# Patient Record
Sex: MALE | Race: WHITE | NOT HISPANIC OR LATINO | Employment: FULL TIME | URBAN - METROPOLITAN AREA
[De-identification: names, ages, dates, MRNs, and addresses within clinical notes are randomized per-mention and may not be internally consistent; named-entity substitution may affect disease eponyms.]

---

## 2017-05-05 ENCOUNTER — ALLSCRIPTS OFFICE VISIT (OUTPATIENT)
Dept: OTHER | Facility: OTHER | Age: 47
End: 2017-05-05

## 2017-05-05 DIAGNOSIS — E78.5 HYPERLIPIDEMIA: ICD-10-CM

## 2017-05-05 DIAGNOSIS — E03.9 HYPOTHYROIDISM: ICD-10-CM

## 2017-05-05 DIAGNOSIS — E55.9 VITAMIN D DEFICIENCY: ICD-10-CM

## 2017-07-06 ENCOUNTER — LAB CONVERSION - ENCOUNTER (OUTPATIENT)
Dept: OTHER | Facility: OTHER | Age: 47
End: 2017-07-06

## 2017-07-06 LAB
25(OH)D3 SERPL-MCNC: 40 NG/ML (ref 30–100)
A/G RATIO (HISTORICAL): 1.7 (CALC) (ref 1–2.5)
ALBUMIN SERPL BCP-MCNC: 4.3 G/DL (ref 3.6–5.1)
ALP SERPL-CCNC: 55 U/L (ref 40–115)
ALT SERPL W P-5'-P-CCNC: 18 U/L (ref 9–46)
AST SERPL W P-5'-P-CCNC: 15 U/L (ref 10–40)
BILIRUB SERPL-MCNC: 0.4 MG/DL (ref 0.2–1.2)
BUN SERPL-MCNC: 12 MG/DL (ref 7–25)
BUN/CREA RATIO (HISTORICAL): ABNORMAL (CALC) (ref 6–22)
CALCIUM SERPL-MCNC: 9.5 MG/DL (ref 8.6–10.3)
CHLORIDE SERPL-SCNC: 105 MMOL/L (ref 98–110)
CHOLEST SERPL-MCNC: 187 MG/DL (ref 125–200)
CHOLEST/HDLC SERPL: 4.2 (CALC)
CO2 SERPL-SCNC: 29 MMOL/L (ref 20–31)
CREAT SERPL-MCNC: 1.13 MG/DL (ref 0.6–1.35)
EGFR AFRICAN AMERICAN (HISTORICAL): 89 ML/MIN/1.73M2
EGFR-AMERICAN CALC (HISTORICAL): 77 ML/MIN/1.73M2
GAMMA GLOBULIN (HISTORICAL): 2.6 G/DL (CALC) (ref 1.9–3.7)
GLUCOSE (HISTORICAL): 104 MG/DL (ref 65–99)
HDLC SERPL-MCNC: 45 MG/DL
LDL CHOLESTEROL (HISTORICAL): 101 MG/DL (CALC)
NON-HDL-CHOL (CHOL-HDL) (HISTORICAL): 142 MG/DL (CALC)
POTASSIUM SERPL-SCNC: 4.4 MMOL/L (ref 3.5–5.3)
SODIUM SERPL-SCNC: 142 MMOL/L (ref 135–146)
T3 SERPL-MCNC: 86 NG/DL (ref 76–181)
T3FREE SERPL-MCNC: 2.7 PG/ML (ref 2.3–4.2)
T4 FREE SERPL-MCNC: 0.7 NG/DL (ref 0.8–1.8)
TOTAL PROTEIN (HISTORICAL): 6.9 G/DL (ref 6.1–8.1)
TRIGL SERPL-MCNC: 203 MG/DL
TSH SERPL DL<=0.05 MIU/L-ACNC: 25.6 MIU/L (ref 0.4–4.5)

## 2017-07-11 ENCOUNTER — ALLSCRIPTS OFFICE VISIT (OUTPATIENT)
Dept: OTHER | Facility: OTHER | Age: 47
End: 2017-07-11

## 2017-12-28 ENCOUNTER — APPOINTMENT (OUTPATIENT)
Dept: LAB | Age: 47
End: 2017-12-28
Payer: COMMERCIAL

## 2017-12-28 ENCOUNTER — TRANSCRIBE ORDERS (OUTPATIENT)
Dept: ADMINISTRATIVE | Age: 47
End: 2017-12-28

## 2017-12-28 DIAGNOSIS — E78.5 HYPERLIPIDEMIA: ICD-10-CM

## 2017-12-28 DIAGNOSIS — E03.9 HYPOTHYROIDISM: ICD-10-CM

## 2017-12-28 LAB
EST. AVERAGE GLUCOSE BLD GHB EST-MCNC: 117 MG/DL
HBA1C MFR BLD: 5.7 % (ref 4.2–6.3)
T3 SERPL-MCNC: 1.3 NG/ML (ref 0.6–1.8)
T3FREE SERPL-MCNC: 3.96 PG/ML (ref 2.3–4.2)
T4 FREE SERPL-MCNC: 0.72 NG/DL (ref 0.76–1.46)
TSH SERPL DL<=0.05 MIU/L-ACNC: 0.47 UIU/ML (ref 0.36–3.74)

## 2017-12-28 PROCEDURE — 84439 ASSAY OF FREE THYROXINE: CPT

## 2017-12-28 PROCEDURE — 84443 ASSAY THYROID STIM HORMONE: CPT

## 2017-12-28 PROCEDURE — 84480 ASSAY TRIIODOTHYRONINE (T3): CPT

## 2017-12-28 PROCEDURE — 84481 FREE ASSAY (FT-3): CPT

## 2017-12-28 PROCEDURE — 36415 COLL VENOUS BLD VENIPUNCTURE: CPT

## 2017-12-28 PROCEDURE — 83036 HEMOGLOBIN GLYCOSYLATED A1C: CPT

## 2018-01-01 DIAGNOSIS — E78.5 HYPERLIPIDEMIA: ICD-10-CM

## 2018-01-01 DIAGNOSIS — E03.9 HYPOTHYROIDISM: ICD-10-CM

## 2018-01-09 ENCOUNTER — ALLSCRIPTS OFFICE VISIT (OUTPATIENT)
Dept: OTHER | Facility: OTHER | Age: 48
End: 2018-01-09

## 2018-01-09 NOTE — RESULT NOTES
Verified Results  (1) T4, FREE [id number] 52:61HM [employer]     Test Name Result Flag Reference   T4, FREE 0 7 ng/dL L 0 8-1 8     (1) FREE T3 [id number] 05:05FA [employer]     Test Name Result Flag Reference   T3, FREE 3 4 pg/mL  2 3-4 2     (1) T3 TOTAL [id number] 05:00CN [employer]     Test Name Result Flag Reference   T3, TOTAL 133 ng/dL       (1) COMPREHENSIVE METABOLIC PANEL [id number] 18:73FJ [employer]     Test Name Result Flag Reference   GLUCOSE 90 mg/dL  65-99   Fasting reference interval   UREA NITROGEN (BUN) 13 mg/dL  7-25   CREATININE 1 07 mg/dL  0 60-1 35   eGFR NON-AFR   AMERICAN 83 mL/min/1 73m2  > OR = 60   eGFR AFRICAN AMERICAN 96 mL/min/1 73m2  > OR = 60   BUN/CREATININE RATIO   4-17   NOT APPLICABLE (calc)   SODIUM 139 mmol/L  135-146   POTASSIUM 3 9 mmol/L  3 5-5 3   CHLORIDE 103 mmol/L     CARBON DIOXIDE 27 mmol/L  19-30   CALCIUM 9 3 mg/dL  8 6-10 3   PROTEIN, TOTAL 7 0 g/dL  6 1-8 1   ALBUMIN 4 5 g/dL  3 6-5 1   GLOBULIN 2 5 g/dL (calc)  1 9-3 7   ALBUMIN/GLOBULIN RATIO 1 8 (calc)  1 0-2 5   BILIRUBIN, TOTAL 0 4 mg/dL  0 2-1 2   ALKALINE PHOSPHATASE 49 U/L     AST 14 U/L  10-40   ALT 14 U/L  9-46     (Q) TSH, 3RD GENERATION [id number] 27:64WY [employer]     Test Name Result Flag Reference   TSH 4 92 mIU/L H 0 40-4 50

## 2018-01-09 NOTE — PROGRESS NOTES
Assessment    1  Neck pain (723 1) (M54 2)   2  Cervical somatic dysfunction (739 1) (M99 01)    Plan  Neck pain    · Hydrocodone-Acetaminophen 5-300 MG Oral Tablet; TAKE 1 TABLET EVERY 4 TO  6 HOURS AS NEEDED FOR PAIN   · Naproxen 500 MG Oral Tablet; TAKE ONE TABLET BY MOUTH TWICE DAILY AS  NEEDED    Discussion/Summary    R I H ROM AS  cont w DC  RX naproxen and vicodin  d/c tramadol due to SE  IFB > 2+ more weeks , MRI  CB 2 wks  The patient, patient's family was counseled regarding prognosis, risks and benefits of treatment options  Chief Complaint  Pt is here c/o right shoulder pain  Pt thinks it's a pinched nerve  He see's a chiro for this and hasn't been helping  History of Present Illness  Neck Pain (Brief): The patient is being seen for an initial evaluation of neck pain  Symptoms:  neck pain, neck stiffness, neck muscle spasm, neck tenderness, shoulder pain and decreased neck range of motion  Associated symptoms:  upper extremity paresthesias, but no fever and no headache  HPI: 40 yo wm, HWW for eval of neck pain  Sees DC  ?? help  Tramadol RX prev make him ?? dizz  RUE tingling ?? improved  No specific injury  Review of Systems    Constitutional: no fever or chills, feels well, no tiredness, no recent weight loss or weight gain  Cardiovascular: no complaints of slow or fast heart rate, no chest pain, no palpitations, no leg claudication or lower extremity edema  Respiratory: no complaints of shortness of breath, no wheezing or cough, no dyspnea on exertion, no orthopnea or PND  Musculoskeletal: as noted in HPI  Active Problems    1  Abdominal pain (789 00) (R10 9)   2  Abdominal pain, suprapubic (789 09) (R10 30)   3  External Hemorrhoids With Bleeding (455 5)   4  Hyperlipemia (272 4) (E78 5)   5  Hypothyroidism (244 9) (E03 9)   6  Internal hemorrhoids (455 0) (K64 8)   7  Radiculopathy (729 2) (M54 10)   8  Right shoulder pain (719 41) (M25 511)   9   Vitamin D deficiency (268 9) (E55 9)    Past Medical History    1  History of Cough (786 2) (R05)   2  History of fatigue (V13 89) (L39 683)    Family History    1  Family history of Colon Cancer (V16 0)    Social History    · Being A Social Drinker   · Current Smoker (305 1)   · Never A Smoker    Current Meds   1  Metaxalone 800 MG Oral Tablet; TAKE 1 TABLET AT BEDTIME; Therapy: 52CEZ4012 to (Evaluate:04Jan2016)  Requested for: 03Kpv8966; Last   Rx:21Dec2015 Ordered   2  NP Thyroid 60 MG Oral Tablet; Take 1 tablet twice daily; Therapy: 21YLJ1691 to (Evaluate:29Dec2016)  Requested for: 64NZI8378; Last   Rx:04Jan2016 Ordered   3  TraMADol HCl - 50 MG Oral Tablet; TAKE ONE TABLET BY MOUTH TWICE DAILY AS   NEEDED FOR SEVERE PAIN;   Therapy: 47KNL6006 to (Evaluate:13Mar2016); Last Rx:13Jan2016; Status: ACTIVE -   Retrospective By Protocol Authorization Ordered   4  Vitamin D-3 5000 UNIT TABS; take 2 tablet daily; Therapy: (Recorded:27Mar2014) to Recorded    Allergies    1  No Known Drug Allergies    Vitals   Recorded: 80MTR2355 47:66PK   Systolic 461   Diastolic 84   Height 5 ft 10 in   Weight 193 lb 2 08 oz   BMI Calculated 27 71   BSA Calculated 2 06     Physical Exam    Constitutional   General appearance: No acute distress, well appearing and well nourished  Pulmonary   Auscultation of lungs: Clear to auscultation, equal breath sounds bilaterally, no wheezes, no rales, no rhonci  Cardiovascular   Auscultation of heart: Normal rate and rhythm, normal S1 and S2, without murmurs  Musculoskeletal +PVMS cerv reg  decr Cerv ROM  Neurologic   Cranial nerves: Cranial nerves 2-12 intact  Reflexes: 2+ and symmetric  Sensation: No sensory loss           Signatures   Electronically signed by : Kim Zhang DO; Jame [id number]  8:54AM EST                       (Author)

## 2018-01-11 NOTE — PROGRESS NOTES
Assessment   1  Hypothyroidism (244 9) (E03 9)   2  Abdominal pain (789 00) (R10 9)    Plan   Abnormal glucose, Hyperlipemia, Hypothyroidism, Thyroid nodule, Vitamin D deficiency    · (1) FREE T3; Status:Active - Retrospective By Protocol Authorization; Requested    for:89Cxt3970;    · (1) COMPREHENSIVE METABOLIC PANEL; Status:Active - Retrospective By Protocol    Authorization; Requested for:23Jkg1828;    · (1) HEMOGLOBIN A1C; Status:Active - Retrospective By Protocol Authorization; Requested for:[id number];    · (1) LIPID PANEL, FASTING; Status:Active - Retrospective By Protocol Authorization; Requested for:45Szv8560;    · (1) TSH; Status:Active - Retrospective By Protocol Authorization; Requested    for:19Ntd2493;   IBS (irritable bowel syndrome)    · Dicyclomine HCl - 20 MG Oral Tablet; take one tab pre meal and one tab qhs prn    Discussion/Summary      Patient was seen today for follow-up of hypothyroidism and to review most recent labs  Hypothyroidism: Patient is to continue with current dose of and NP thyroid  Will continue to monitor  Will recheck lab work in July  Abdominal pain: Patient states is mostly in the early morning time TUMS and Pepto-Bismol have been helping  Patient was prescribed doxycycline to try  He states that he eats late at night goes right to bed  He was educated to try and eat earlier and wait a few hours to digest before he goes to bed  He can continue to take Tums and Pepto-Bismol as needed  is to follow up again in July  At that time will review order lab work which is a free T3, CMP, A1c, lipid panel and TSH  Chief Complaint   F/U neck pain  ksd,cma      History of Present Illness      (Hypothyroidism: stable,  Neck Pain: Takes Nitromax pill and that helped with the blood flow, he does not work out)  Symptoms: denies chest pain,-- denies dyspnea,-- denies lower extremity edema,-- denies muscle pain-- and-- denies muscle weakness   Associated symptoms include no palpitations,-- no syncope-- and-- no headache  Lifestyle and Disease Management: Diet: He consumes a diverse and healthy diet  Exercise: He exercises regularly  -- walks  Smoking: He does not use tobacco  Alcohol: He consumes alcohol  -- social  Goals: the patient is doing well with his goals  Patient presents for follow up of hypothyroidism and to review lab work      Review of Systems        Constitutional: no fever-- and-- no chills  Eyes: no eyesight problems  Cardiovascular: no chest pain-- and-- no palpitations  Respiratory: no shortness of breath-- and-- no cough  Gastrointestinal: Feels bloated in the AM, but-- no abdominal pain,-- no nausea,-- no vomiting-- and-- no diarrhea  Genitourinary: no dysuria-- and-- no incontinence  Musculoskeletal: no myalgias  Integumentary: no rashes-- and-- no skin lesions  Neurological: no headache-- and-- no fainting  Active Problems   1  Abdominal pain (789 00) (R10 9)   2  Anxiety (300 00) (F41 9)   3  Bloated abdomen (787 3) (R14 0)   4  Cervical somatic dysfunction (739 1) (M99 01)   5  External Hemorrhoids With Bleeding (455 5)   6  GERD without esophagitis (530 81) (K21 9)   7  Hyperlipemia (272 4) (E78 5)   8  Hypothyroidism (244 9) (E03 9)   9  IBS (irritable bowel syndrome) (564 1) (K58 9)   10  Internal hemorrhoids (455 0) (K64 8)   11  Midepigastric pain (789 06) (R10 13)   12  Neck pain (723 1) (M54 2)   13  Radiculopathy (729 2) (M54 10)   14  Right shoulder pain (719 41) (M25 511)   15  Thyroid nodule (241 0) (E04 1)   16  Vitamin D deficiency (268 9) (E55 9)    Past Medical History   1  History of Abdominal pain, suprapubic (789 09) (R10 2)   2  History of Cough (786 2) (R05)   3  History of diarrhea (V12 79) (Z87 898)   4  History of fatigue (V13 89) (Z87 898)   5  History of nausea (V12 79) (N26 896)    Surgical History   1  History of Hemorrhoidectomy    Family History   Family History    1   Family history of Colon Cancer (V16 0)    Social History    · Being A Social Drinker   · Current Smoker (305 1)   · Never A Smoker  The social history was reviewed and updated today  The social history was reviewed and is unchanged  Current Meds    1  NP Thyroid 60 MG Oral Tablet; take one tablet by mouth twice daily; Therapy: 22EYV0393 to ([facility] [place] [facility])  Requested for: 88Iod5425; Last     Rx:15Hel3879 Ordered     The medication list was reviewed and updated today  Allergies   1  PredniSONE TABS    Vitals   Vital Signs    Recorded: 03ZAU0862 27:29LT   Systolic 946   Diastolic 88   Height 5 ft 10 in   Weight 214 lb 6 oz   BMI Calculated 30 76   BSA Calculated 2 15     Physical Exam        Constitutional      General appearance: Abnormal   obese  Eyes      Conjunctiva and lids: No swelling, erythema, or discharge  Ears, Nose, Mouth, and Throat      External inspection of ears and nose: Normal        Pulmonary      Respiratory effort: No increased work of breathing or signs of respiratory distress  Auscultation of lungs: Clear to auscultation, equal breath sounds bilaterally, no wheezes, no rales, no rhonci  Cardiovascular      Auscultation of heart: Normal rate and rhythm, normal S1 and S2, without murmurs  -- No edema  Abdomen Positive bowel sounds, soft nontender        Musculoskeletal      Gait and station: Normal        Psychiatric      Orientation to person, place and time: Normal        Mood and affect: Normal           Signatures    Electronically signed by : Jesusita John DO; Jame [id number]  8:01AM EST                       (Author)

## 2018-01-12 NOTE — MISCELLANEOUS
Message  Return to work or school:   Rita Lezama is under my professional care   He was seen in my office on 01/14/2016   He is able to return to work on  23/71/8467       Jessie Michel DO/am       Signatures   Electronically signed by : Ravinder Jones, ; Jan 14 2016 10:54AM EST                       (Author)

## 2018-01-13 VITALS
SYSTOLIC BLOOD PRESSURE: 122 MMHG | BODY MASS INDEX: 28.26 KG/M2 | DIASTOLIC BLOOD PRESSURE: 70 MMHG | HEIGHT: 70 IN | WEIGHT: 197.38 LBS

## 2018-01-14 VITALS
HEIGHT: 70 IN | DIASTOLIC BLOOD PRESSURE: 84 MMHG | BODY MASS INDEX: 28.1 KG/M2 | SYSTOLIC BLOOD PRESSURE: 130 MMHG | WEIGHT: 196.25 LBS

## 2018-01-23 VITALS
HEIGHT: 70 IN | SYSTOLIC BLOOD PRESSURE: 124 MMHG | WEIGHT: 214.38 LBS | BODY MASS INDEX: 30.69 KG/M2 | DIASTOLIC BLOOD PRESSURE: 88 MMHG

## 2018-02-12 DIAGNOSIS — K58.9 IRRITABLE BOWEL SYNDROME, UNSPECIFIED TYPE: Primary | ICD-10-CM

## 2018-02-12 RX ORDER — DICYCLOMINE HCL 20 MG
TABLET ORAL
Qty: 40 TABLET | Refills: 1 | Status: SHIPPED | OUTPATIENT
Start: 2018-02-12 | End: 2018-04-02 | Stop reason: SDUPTHER

## 2018-04-02 DIAGNOSIS — K58.9 IRRITABLE BOWEL SYNDROME, UNSPECIFIED TYPE: ICD-10-CM

## 2018-04-02 RX ORDER — DICYCLOMINE HCL 20 MG
TABLET ORAL
Qty: 40 TABLET | Refills: 1 | Status: SHIPPED | OUTPATIENT
Start: 2018-04-02 | End: 2018-05-29 | Stop reason: SDUPTHER

## 2018-04-03 ENCOUNTER — TELEPHONE (OUTPATIENT)
Dept: FAMILY MEDICINE CLINIC | Facility: CLINIC | Age: 48
End: 2018-04-03

## 2018-04-03 DIAGNOSIS — R05.9 COUGH: Primary | ICD-10-CM

## 2018-04-03 NOTE — TELEPHONE ENCOUNTER
Patient called and stated he is getting over a cold but he has a cough that is not getting any better  In the past you prescribed him promethazine with codeine and it worked well for him   Asking if you can send that to the pharm

## 2018-04-05 DIAGNOSIS — R05.9 COUGH: Primary | ICD-10-CM

## 2018-04-05 RX ORDER — PROMETHAZINE HYDROCHLORIDE AND CODEINE PHOSPHATE 6.25; 1 MG/5ML; MG/5ML
5 SYRUP ORAL EVERY 6 HOURS PRN
Qty: 118 ML | Refills: 0 | Status: SHIPPED | OUTPATIENT
Start: 2018-04-05 | End: 2018-05-14

## 2018-04-09 RX ORDER — PROMETHAZINE HYDROCHLORIDE AND CODEINE PHOSPHATE 6.25; 1 MG/5ML; MG/5ML
5 SYRUP ORAL EVERY 4 HOURS PRN
Qty: 118 ML | Refills: 0 | OUTPATIENT
Start: 2018-04-09 | End: 2018-05-14

## 2018-04-19 ENCOUNTER — OFFICE VISIT (OUTPATIENT)
Dept: FAMILY MEDICINE CLINIC | Facility: CLINIC | Age: 48
End: 2018-04-19
Payer: COMMERCIAL

## 2018-04-19 ENCOUNTER — TELEPHONE (OUTPATIENT)
Dept: FAMILY MEDICINE CLINIC | Facility: CLINIC | Age: 48
End: 2018-04-19

## 2018-04-19 VITALS
TEMPERATURE: 98.2 F | SYSTOLIC BLOOD PRESSURE: 138 MMHG | WEIGHT: 194.6 LBS | HEIGHT: 70 IN | BODY MASS INDEX: 27.86 KG/M2 | DIASTOLIC BLOOD PRESSURE: 84 MMHG

## 2018-04-19 DIAGNOSIS — R05.9 COUGH: ICD-10-CM

## 2018-04-19 DIAGNOSIS — J06.9 UPPER RESPIRATORY TRACT INFECTION, UNSPECIFIED TYPE: Primary | ICD-10-CM

## 2018-04-19 PROBLEM — E04.1 THYROID NODULE: Status: ACTIVE | Noted: 2017-05-05

## 2018-04-19 PROBLEM — R73.09 ABNORMAL GLUCOSE: Status: ACTIVE | Noted: 2018-01-09

## 2018-04-19 PROCEDURE — 99213 OFFICE O/P EST LOW 20 MIN: CPT | Performed by: NURSE PRACTITIONER

## 2018-04-19 RX ORDER — BENZONATATE 100 MG/1
100 CAPSULE ORAL 3 TIMES DAILY PRN
Qty: 20 CAPSULE | Refills: 0 | Status: SHIPPED | OUTPATIENT
Start: 2018-04-19 | End: 2018-08-08 | Stop reason: ALTCHOICE

## 2018-04-19 RX ORDER — AZITHROMYCIN 250 MG/1
TABLET, FILM COATED ORAL
Qty: 6 TABLET | Refills: 0 | Status: SHIPPED | OUTPATIENT
Start: 2018-04-19 | End: 2018-04-24

## 2018-04-19 RX ORDER — LEVOTHYROXINE AND LIOTHYRONINE 38; 9 UG/1; UG/1
1 TABLET ORAL 2 TIMES DAILY
COMMUNITY
Start: 2014-03-20 | End: 2018-07-30 | Stop reason: SDUPTHER

## 2018-04-19 RX ORDER — ALBUTEROL SULFATE 90 UG/1
2 AEROSOL, METERED RESPIRATORY (INHALATION) EVERY 6 HOURS PRN
Qty: 1 INHALER | Refills: 2 | Status: SHIPPED | OUTPATIENT
Start: 2018-04-19 | End: 2019-08-26

## 2018-04-19 NOTE — TELEPHONE ENCOUNTER
Pt had a reaction to prednisone the last time it was prescribed, added it to allergy list in chart  Please advise there is anything else he could do

## 2018-04-19 NOTE — PROGRESS NOTES
Assessment/Plan:    URI with cough:  Cough for 3 weeks  Cold symptoms resolved  Will start azithromycin since he hasn't tried antibiotic  Will give tessalon perles to take during the day  Given ventolin inhaler to take every 6 hours as needed for cough/ shortness of breath/ wheezing  Lungs CTA at visit today  If cough persists after antibiotics completed, we will then obtain chest x ray  Patient is to call if any worsening of symptoms or no improvement  Patient verbalizes understand and agrees with treatment plan  Diagnoses and all orders for this visit:    Upper respiratory tract infection, unspecified type  -     azithromycin (ZITHROMAX) 250 mg tablet; Take 2 tablets today then 1 tablet daily x 4 days  -     albuterol (PROVENTIL HFA,VENTOLIN HFA) 90 mcg/act inhaler; Inhale 2 puffs every 6 (six) hours as needed for wheezing    Cough  -     benzonatate (TESSALON PERLES) 100 mg capsule; Take 1 capsule (100 mg total) by mouth 3 (three) times a day as needed for cough  -     albuterol (PROVENTIL HFA,VENTOLIN HFA) 90 mcg/act inhaler; Inhale 2 puffs every 6 (six) hours as needed for wheezing    Other orders  -     thyroid (NP THYROID) 60 MG tablet; Take 1 tablet by mouth 2 (two) times a day            Subjective:      Patient ID: Mary Ball is a 50 y o  male  Chief Complaint   Patient presents with    Cough     Complaining of a cough for the past 3 weeks, he did have cold symptoms which lasted for about a week and then resolved but can't get rid of the cough  Yue Akins presents to office today for persistent cough  Had cold symptoms that started in beginning of April April 3rd he was given promethazine with codeine by Dr Riya Mitchell  Using cough drops and promethazine with codeine at night  Dry non productive cough  Feels himself wheezing at night sometimes  Cough   This is a new problem  The current episode started 1 to 4 weeks ago  The problem has been unchanged   The problem occurs every few minutes  The cough is non-productive  Associated symptoms include headaches (sinus headache) and wheezing  Pertinent negatives include no chest pain, chills, ear congestion, ear pain, fever, myalgias, nasal congestion, postnasal drip, rash, rhinorrhea, sore throat or shortness of breath  The following portions of the patient's history were reviewed and updated as appropriate: allergies, current medications, past family history, past social history and problem list     Review of Systems   Constitutional: Negative for chills and fever  HENT: Negative for ear pain, postnasal drip, rhinorrhea and sore throat  Respiratory: Positive for cough and wheezing  Negative for shortness of breath  Cardiovascular: Negative for chest pain  Gastrointestinal: Negative for abdominal pain, diarrhea, nausea and vomiting  Genitourinary: Negative for difficulty urinating and dysuria  Musculoskeletal: Negative for myalgias  Skin: Negative for rash  Neurological: Positive for headaches (sinus headache)  Negative for dizziness  Psychiatric/Behavioral: Negative for agitation  Objective:  /84 (BP Location: Right arm, Patient Position: Sitting, Cuff Size: Standard)   Temp 98 2 °F (36 8 °C) (Tympanic)   Ht 5' 10" (1 778 m)   Wt 88 3 kg (194 lb 9 6 oz)   BMI 27 92 kg/m²      Physical Exam   Constitutional: He is oriented to person, place, and time  He appears well-developed  No distress  HENT:   Head: Normocephalic and atraumatic  Right Ear: External ear normal    Left Ear: External ear normal    Nose: Nose normal  Right sinus exhibits no maxillary sinus tenderness and no frontal sinus tenderness  Left sinus exhibits no maxillary sinus tenderness and no frontal sinus tenderness  Mouth/Throat: Posterior oropharyngeal erythema present  No oropharyngeal exudate or posterior oropharyngeal edema  Eyes: Conjunctivae and lids are normal  Right eye exhibits no discharge   Left eye exhibits no discharge  Neck: Normal range of motion  Neck supple  No tracheal deviation present  Cardiovascular: Normal rate and regular rhythm  No murmur heard  Pulmonary/Chest: Effort normal and breath sounds normal  No respiratory distress  He has no wheezes  Abdominal: Soft  Bowel sounds are normal  He exhibits no distension  There is no tenderness  There is no guarding  Musculoskeletal: Normal range of motion  He exhibits no edema or deformity  Lymphadenopathy:     He has no cervical adenopathy  Neurological: He is alert and oriented to person, place, and time  Skin: Skin is warm and dry  No rash noted  He is not diaphoretic  No erythema  Psychiatric: He has a normal mood and affect  His speech is normal and behavior is normal  Judgment and thought content normal  Cognition and memory are normal    Nursing note and vitals reviewed

## 2018-04-19 NOTE — TELEPHONE ENCOUNTER
If he has not done it do a 6 day course of prednisone [id number]    If no better after that needs office visit

## 2018-04-19 NOTE — PATIENT INSTRUCTIONS
Start antibiotic, this is azithromycin  This is 2 pills today and 1 pill for following 4 days  Use tessalon perles every 8 hours as needed for cough  Use albuterol inhaler every 6 hours, 2 puffs as needed for coughing wheezing or shortness of breath  Call if any worsening of symptoms or no improvement

## 2018-04-19 NOTE — TELEPHONE ENCOUNTER
Patient called and stated he still has a lingering cough  He describes it as a tickling cough and its dry  He does not bring anything up  No chest pain  No congestion  He does take promethazine at night   Wants to know what else he can do

## 2018-05-01 ENCOUNTER — TELEPHONE (OUTPATIENT)
Dept: FAMILY MEDICINE CLINIC | Facility: CLINIC | Age: 48
End: 2018-05-01

## 2018-05-01 DIAGNOSIS — J01.90 ACUTE NON-RECURRENT SINUSITIS, UNSPECIFIED LOCATION: Primary | ICD-10-CM

## 2018-05-01 DIAGNOSIS — E03.9 HYPOTHYROIDISM: ICD-10-CM

## 2018-05-01 DIAGNOSIS — R05.9 COUGH: ICD-10-CM

## 2018-05-01 RX ORDER — AMOXICILLIN AND CLAVULANATE POTASSIUM 875; 125 MG/1; MG/1
1 TABLET, FILM COATED ORAL EVERY 12 HOURS SCHEDULED
Qty: 14 TABLET | Refills: 0 | Status: SHIPPED | OUTPATIENT
Start: 2018-05-01 | End: 2018-05-08

## 2018-05-01 NOTE — TELEPHONE ENCOUNTER
Pt called in stating that he was in the office last week and seen Leelee Helms , he states that he finished the antibiotic that was prescribed for him but he is still not feeling well  Ramona Nguyen said he still feels as if he has a head cold  Ramona Nguyen would like to know if you can please prescribe him something else to help him feel better    To [facility]# [phone]

## 2018-05-01 NOTE — TELEPHONE ENCOUNTER
Pt aware and he will go to a [facility] facility for the chest xray   If not already ordered I will put the script in

## 2018-05-01 NOTE — TELEPHONE ENCOUNTER
Will try different antibiotic  This is Augmentin sent to pharmacy  This is one pill twice daily for 7 days  Take with food  I would also like him to get a chest x ray for how prolonged this cough has been  We can mail script out to him for x ray or he can just go to a [facility] location and it will be in the system  Thanks!

## 2018-05-01 NOTE — TELEPHONE ENCOUNTER
Pt has cough, post-nasal drip, head congestion - antibiotic did not help at all only OTC dayquil/nyquil

## 2018-05-01 NOTE — TELEPHONE ENCOUNTER
He was seen about two weeks ago  Is he still coughing? Any fever? Did his symptoms get better after the antibiotic? Just want to verify what symptoms he's still having to make the best decision for him     Thanks

## 2018-05-14 ENCOUNTER — OFFICE VISIT (OUTPATIENT)
Dept: FAMILY MEDICINE CLINIC | Facility: CLINIC | Age: 48
End: 2018-05-14
Payer: COMMERCIAL

## 2018-05-14 VITALS
SYSTOLIC BLOOD PRESSURE: 124 MMHG | HEIGHT: 69 IN | BODY MASS INDEX: 29.53 KG/M2 | DIASTOLIC BLOOD PRESSURE: 90 MMHG | WEIGHT: 199.4 LBS

## 2018-05-14 DIAGNOSIS — R06.2 WHEEZING: Primary | ICD-10-CM

## 2018-05-14 DIAGNOSIS — R05.9 COUGH: ICD-10-CM

## 2018-05-14 DIAGNOSIS — J98.01 BRONCHOSPASM: ICD-10-CM

## 2018-05-14 PROCEDURE — 99213 OFFICE O/P EST LOW 20 MIN: CPT | Performed by: FAMILY MEDICINE

## 2018-05-14 PROCEDURE — 96372 THER/PROPH/DIAG INJ SC/IM: CPT | Performed by: FAMILY MEDICINE

## 2018-05-14 PROCEDURE — 1036F TOBACCO NON-USER: CPT | Performed by: FAMILY MEDICINE

## 2018-05-14 PROCEDURE — 3008F BODY MASS INDEX DOCD: CPT | Performed by: FAMILY MEDICINE

## 2018-05-14 RX ORDER — CYCLOBENZAPRINE HCL 10 MG
TABLET ORAL
Refills: 1 | COMMUNITY
Start: 2018-04-30 | End: 2019-08-26

## 2018-05-14 RX ORDER — BUDESONIDE AND FORMOTEROL FUMARATE DIHYDRATE 160; 4.5 UG/1; UG/1
2 AEROSOL RESPIRATORY (INHALATION) 2 TIMES DAILY
Qty: 1 INHALER | Refills: 0 | Status: SHIPPED | OUTPATIENT
Start: 2018-05-14 | End: 2018-06-10 | Stop reason: SDUPTHER

## 2018-05-14 RX ORDER — METHYLPREDNISOLONE ACETATE 80 MG/ML
80 INJECTION, SUSPENSION INTRA-ARTICULAR; INTRALESIONAL; INTRAMUSCULAR; SOFT TISSUE ONCE
Status: COMPLETED | OUTPATIENT
Start: 2018-05-14 | End: 2018-05-14

## 2018-05-14 RX ORDER — PREDNISONE 10 MG/1
TABLET ORAL
Qty: 21 TABLET | Refills: 0 | Status: SHIPPED | OUTPATIENT
Start: 2018-05-14 | End: 2018-08-08 | Stop reason: ALTCHOICE

## 2018-05-14 RX ADMIN — METHYLPREDNISOLONE ACETATE 80 MG: 80 INJECTION, SUSPENSION INTRA-ARTICULAR; INTRALESIONAL; INTRAMUSCULAR; SOFT TISSUE at 09:41

## 2018-05-14 NOTE — PROGRESS NOTES
Assessment/Plan:    Patient here with refractory cough x7 weeks  Has already had antibiotics and Tessalon Perles  He has declined taking prednisone because of a past episode of rectal bleeding but I tried to reassure him that this is probably unlikely  He is willing to do this  Start with 80 milligrams Depo-Medrol, prednisone [id number] and a script for Symbicort 160  Call back 1 week  If not improving chest x-ray  Diagnoses and all orders for this visit:    Wheezing  -     predniSONE 10 mg tablet; 6 tabs Day 1, 5 tabs Day 2, 4 tabs Day 3, 3 tabs Day 4, 2 tabs Day 5, 1 tab Day 6   -     budesonide-formoterol (SYMBICORT) 160-4 5 mcg/act inhaler; Inhale 2 puffs 2 (two) times a day  -     methylPREDNISolone acetate (DEPO-MEDROL) injection 80 mg; Inject 1 mL (80 mg total) into the shoulder, thigh, or buttocks once     Bronchospasm    Cough    Other orders  -     cyclobenzaprine (FLEXERIL) 10 mg tablet; TAKE ONE TABLET TWICE A DAY AS NEEDED FOR MUSCLE STIFFNESS        There are no Patient Instructions on file for this visit  Subjective:        Patient ID: Gabbi Storey is a 50 y o  male  Chief Complaint   Patient presents with    Cough     x 7 w       Patient here with refractory cough x7 weeks  Was seen by this office and prescribed antibiotics  Also given Tessalon Perles  He is using his Ventolin  Had declined taking any steroids because of a past issue with rectal bleeding  The following portions of the patient's history were reviewed and updated as appropriate: past medical history, past surgical history and problem list       Review of Systems   Constitutional: Negative for fatigue and fever  HENT: Negative  Respiratory: Positive for cough, chest tightness and wheezing  Negative for shortness of breath  Cardiovascular: Negative  Musculoskeletal: Negative for back pain  Neurological: Negative for dizziness           Objective:  /90   Ht 5' 9" (1 753 m)   Wt 90 4 kg (199 lb 6 4 oz)   BMI 29 45 kg/m²        Physical Exam   Constitutional: He is oriented to person, place, and time  He appears well-nourished  Positive coughing   HENT:   Mouth/Throat: Oropharynx is clear and moist  No oropharyngeal exudate  Cardiovascular: Normal rate, regular rhythm and normal heart sounds  Pulmonary/Chest: Effort normal  No respiratory distress  He has wheezes  Lymphadenopathy:     He has no cervical adenopathy  Neurological: He is alert and oriented to person, place, and time  Psychiatric: He has a normal mood and affect

## 2018-05-19 DIAGNOSIS — R05.9 COUGH: Primary | ICD-10-CM

## 2018-05-21 RX ORDER — PROMETHAZINE HYDROCHLORIDE AND CODEINE PHOSPHATE 6.25; 1 MG/5ML; MG/5ML
SYRUP ORAL
Qty: 118 ML | Refills: 0 | Status: SHIPPED | OUTPATIENT
Start: 2018-05-21 | End: 2018-08-08 | Stop reason: ALTCHOICE

## 2018-05-29 DIAGNOSIS — K58.9 IRRITABLE BOWEL SYNDROME, UNSPECIFIED TYPE: ICD-10-CM

## 2018-05-30 RX ORDER — DICYCLOMINE HCL 20 MG
20 TABLET ORAL 2 TIMES DAILY PRN
Qty: 40 TABLET | Refills: 1 | Status: SHIPPED | OUTPATIENT
Start: 2018-05-30 | End: 2018-07-11 | Stop reason: SDUPTHER

## 2018-06-10 DIAGNOSIS — R06.2 WHEEZING: ICD-10-CM

## 2018-06-11 RX ORDER — BUDESONIDE AND FORMOTEROL FUMARATE DIHYDRATE 160; 4.5 UG/1; UG/1
AEROSOL RESPIRATORY (INHALATION)
Qty: 10.2 INHALER | Refills: 0 | Status: SHIPPED | OUTPATIENT
Start: 2018-06-11 | End: 2018-08-08 | Stop reason: ALTCHOICE

## 2018-07-09 DIAGNOSIS — E55.9 VITAMIN D DEFICIENCY: ICD-10-CM

## 2018-07-09 DIAGNOSIS — E04.1 NONTOXIC SINGLE THYROID NODULE: ICD-10-CM

## 2018-07-09 DIAGNOSIS — R73.09 OTHER ABNORMAL GLUCOSE: ICD-10-CM

## 2018-07-09 DIAGNOSIS — E78.5 HYPERLIPIDEMIA: ICD-10-CM

## 2018-07-09 DIAGNOSIS — E03.9 HYPOTHYROIDISM: ICD-10-CM

## 2018-07-11 DIAGNOSIS — K58.9 IRRITABLE BOWEL SYNDROME, UNSPECIFIED TYPE: ICD-10-CM

## 2018-07-12 RX ORDER — DICYCLOMINE HCL 20 MG
TABLET ORAL
Qty: 40 TABLET | Refills: 1 | Status: SHIPPED | OUTPATIENT
Start: 2018-07-12 | End: 2018-08-22 | Stop reason: SDUPTHER

## 2018-07-30 ENCOUNTER — APPOINTMENT (OUTPATIENT)
Dept: LAB | Age: 48
End: 2018-07-30
Payer: COMMERCIAL

## 2018-07-30 DIAGNOSIS — E78.5 HYPERLIPIDEMIA: ICD-10-CM

## 2018-07-30 DIAGNOSIS — E03.9 HYPOTHYROIDISM: ICD-10-CM

## 2018-07-30 DIAGNOSIS — E03.9 ACQUIRED HYPOTHYROIDISM: Primary | ICD-10-CM

## 2018-07-30 DIAGNOSIS — E04.1 NONTOXIC SINGLE THYROID NODULE: ICD-10-CM

## 2018-07-30 DIAGNOSIS — R73.09 OTHER ABNORMAL GLUCOSE: ICD-10-CM

## 2018-07-30 DIAGNOSIS — E55.9 VITAMIN D DEFICIENCY: ICD-10-CM

## 2018-07-30 LAB
T3FREE SERPL-MCNC: 3.33 PG/ML (ref 2.3–4.2)
TSH SERPL DL<=0.05 MIU/L-ACNC: 0.25 UIU/ML (ref 0.36–3.74)

## 2018-07-30 PROCEDURE — 84481 FREE ASSAY (FT-3): CPT

## 2018-07-30 PROCEDURE — 36415 COLL VENOUS BLD VENIPUNCTURE: CPT

## 2018-07-30 PROCEDURE — 84443 ASSAY THYROID STIM HORMONE: CPT

## 2018-07-30 RX ORDER — LEVOTHYROXINE, LIOTHYRONINE 38; 9 UG/1; UG/1
TABLET ORAL
Qty: 180 TABLET | Refills: 2 | Status: SHIPPED | OUTPATIENT
Start: 2018-07-30 | End: 2019-05-31 | Stop reason: SDUPTHER

## 2018-08-08 ENCOUNTER — OFFICE VISIT (OUTPATIENT)
Dept: FAMILY MEDICINE CLINIC | Facility: CLINIC | Age: 48
End: 2018-08-08
Payer: COMMERCIAL

## 2018-08-08 VITALS
DIASTOLIC BLOOD PRESSURE: 82 MMHG | HEIGHT: 69 IN | BODY MASS INDEX: 29.77 KG/M2 | WEIGHT: 201 LBS | SYSTOLIC BLOOD PRESSURE: 142 MMHG

## 2018-08-08 DIAGNOSIS — E03.9 ACQUIRED HYPOTHYROIDISM: Primary | ICD-10-CM

## 2018-08-08 DIAGNOSIS — E78.00 PURE HYPERCHOLESTEROLEMIA: ICD-10-CM

## 2018-08-08 DIAGNOSIS — R73.09 ABNORMAL GLUCOSE: ICD-10-CM

## 2018-08-08 DIAGNOSIS — M99.01 CERVICAL SOMATIC DYSFUNCTION: ICD-10-CM

## 2018-08-08 DIAGNOSIS — M54.12 CERVICAL RADICULOPATHY: ICD-10-CM

## 2018-08-08 PROCEDURE — 99213 OFFICE O/P EST LOW 20 MIN: CPT | Performed by: FAMILY MEDICINE

## 2018-08-08 NOTE — PROGRESS NOTES
Assessment/Plan:    Patient clinically is stable  Continue present thyroid medication and recheck in 6 months  Patient will be due for an A1c at that time as is elevated fasting sugar has been slightly elevated  Also update lipid profile at that time  Recommend x-ray for cervical complaint especially with intermittent right upper extremity paresthesias  Recheck 6 months     Diagnoses and all orders for this visit:    Acquired hypothyroidism  -     T3, free; Future  -     TSH, 3rd generation; Future    Abnormal glucose  -     Hemoglobin A1C; Future    Pure hypercholesterolemia  -     Comprehensive metabolic panel; Future  -     Lipid panel; Future    Cervical somatic dysfunction  -     XR spine cervical complete 4 or 5 vw non injury; Future    Cervical radiculopathy  -     XR spine cervical complete 4 or 5 vw non injury; Future        There are no Patient Instructions on file for this visit  Subjective:        Patient ID: Hui Herrera is a 50 y o  male  Chief Complaint   Patient presents with    Follow-up     TSH       Patient here for recheck of thyroid labs  Overall feels well  If intermittent neck stiffness and right upper times  Does see chiropractor  The following portions of the patient's history were reviewed and updated as appropriate: past medical history, past surgical history and problem list       Review of Systems   Constitutional: Negative for fatigue and unexpected weight change  Respiratory: Negative for shortness of breath  Cardiovascular: Negative for chest pain  Musculoskeletal: Positive for neck stiffness  Neurological:        Intermittent right upper extremity tingling but not into hands  Objective:  /82 (BP Location: Left arm, Patient Position: Sitting, Cuff Size: Standard)   Ht 5' 9" (1 753 m)   Wt 91 2 kg (201 lb)   BMI 29 68 kg/m²        Physical Exam   Constitutional: He appears well-nourished  No distress  HENT:   Head: Normocephalic  Neck: No thyromegaly present  Cardiovascular: Normal rate and normal heart sounds  No murmur heard  Pulmonary/Chest: Breath sounds normal    Musculoskeletal: Normal range of motion  He exhibits no edema  Neurological: He displays normal reflexes  Psychiatric: He has a normal mood and affect

## 2018-08-22 DIAGNOSIS — K58.9 IRRITABLE BOWEL SYNDROME, UNSPECIFIED TYPE: ICD-10-CM

## 2018-08-22 RX ORDER — DICYCLOMINE HCL 20 MG
TABLET ORAL
Qty: 40 TABLET | Refills: 1 | Status: SHIPPED | OUTPATIENT
Start: 2018-08-22 | End: 2018-10-07 | Stop reason: SDUPTHER

## 2018-09-06 DIAGNOSIS — G89.29 CHRONIC LOW BACK PAIN WITHOUT SCIATICA, UNSPECIFIED BACK PAIN LATERALITY: Primary | ICD-10-CM

## 2018-09-06 DIAGNOSIS — M54.50 CHRONIC LOW BACK PAIN WITHOUT SCIATICA, UNSPECIFIED BACK PAIN LATERALITY: Primary | ICD-10-CM

## 2018-09-06 RX ORDER — NAPROXEN 500 MG/1
TABLET ORAL
Qty: 60 TABLET | Refills: 1 | Status: SHIPPED | OUTPATIENT
Start: 2018-09-06 | End: 2018-11-04 | Stop reason: SDUPTHER

## 2018-10-07 DIAGNOSIS — K58.9 IRRITABLE BOWEL SYNDROME, UNSPECIFIED TYPE: ICD-10-CM

## 2018-10-08 RX ORDER — DICYCLOMINE HCL 20 MG
TABLET ORAL
Qty: 40 TABLET | Refills: 1 | Status: SHIPPED | OUTPATIENT
Start: 2018-10-08 | End: 2018-11-18 | Stop reason: SDUPTHER

## 2018-11-04 DIAGNOSIS — G89.29 CHRONIC LOW BACK PAIN WITHOUT SCIATICA, UNSPECIFIED BACK PAIN LATERALITY: ICD-10-CM

## 2018-11-04 DIAGNOSIS — M54.50 CHRONIC LOW BACK PAIN WITHOUT SCIATICA, UNSPECIFIED BACK PAIN LATERALITY: ICD-10-CM

## 2018-11-05 RX ORDER — NAPROXEN 500 MG/1
TABLET ORAL
Qty: 60 TABLET | Refills: 1 | Status: SHIPPED | OUTPATIENT
Start: 2018-11-05 | End: 2019-08-26

## 2018-11-13 ENCOUNTER — TRANSCRIBE ORDERS (OUTPATIENT)
Dept: FAMILY MEDICINE CLINIC | Facility: CLINIC | Age: 48
End: 2018-11-13

## 2018-11-18 DIAGNOSIS — K58.9 IRRITABLE BOWEL SYNDROME, UNSPECIFIED TYPE: ICD-10-CM

## 2018-11-20 RX ORDER — DICYCLOMINE HCL 20 MG
TABLET ORAL
Qty: 40 TABLET | Refills: 1 | Status: SHIPPED | OUTPATIENT
Start: 2018-11-20 | End: 2019-02-06 | Stop reason: SDUPTHER

## 2019-02-06 DIAGNOSIS — K58.9 IRRITABLE BOWEL SYNDROME, UNSPECIFIED TYPE: ICD-10-CM

## 2019-02-07 RX ORDER — DICYCLOMINE HCL 20 MG
TABLET ORAL
Qty: 40 TABLET | Refills: 1 | Status: SHIPPED | OUTPATIENT
Start: 2019-02-07 | End: 2019-03-18 | Stop reason: SDUPTHER

## 2019-02-19 DIAGNOSIS — E03.9 ACQUIRED HYPOTHYROIDISM: Primary | ICD-10-CM

## 2019-02-19 NOTE — TELEPHONE ENCOUNTER
Can you ask him what they feel is the best correlating dose    The usually are very willing to do that for us and then plug in 30 days with 5 refills and I'll sign it

## 2019-02-19 NOTE — TELEPHONE ENCOUNTER
Pharmacy called stating pt's NP Thyroid is on back order at the moment, wants to know if their is an alternative

## 2019-02-20 RX ORDER — LEVOTHYROXINE AND LIOTHYRONINE 38; 9 UG/1; UG/1
60 TABLET ORAL DAILY
Qty: 30 TABLET | Refills: 5 | Status: SHIPPED | OUTPATIENT
Start: 2019-02-20 | End: 2020-09-16

## 2019-02-25 ENCOUNTER — OFFICE VISIT (OUTPATIENT)
Dept: FAMILY MEDICINE CLINIC | Facility: CLINIC | Age: 49
End: 2019-02-25
Payer: COMMERCIAL

## 2019-02-25 ENCOUNTER — TELEPHONE (OUTPATIENT)
Dept: FAMILY MEDICINE CLINIC | Facility: CLINIC | Age: 49
End: 2019-02-25

## 2019-02-25 VITALS
HEIGHT: 71 IN | SYSTOLIC BLOOD PRESSURE: 130 MMHG | HEART RATE: 81 BPM | WEIGHT: 205.4 LBS | OXYGEN SATURATION: 97 % | DIASTOLIC BLOOD PRESSURE: 82 MMHG | BODY MASS INDEX: 28.76 KG/M2

## 2019-02-25 DIAGNOSIS — E03.9 ACQUIRED HYPOTHYROIDISM: Primary | ICD-10-CM

## 2019-02-25 DIAGNOSIS — E78.00 PURE HYPERCHOLESTEROLEMIA: ICD-10-CM

## 2019-02-25 DIAGNOSIS — R73.09 ABNORMAL GLUCOSE: ICD-10-CM

## 2019-02-25 DIAGNOSIS — Z00.00 HEALTHCARE MAINTENANCE: ICD-10-CM

## 2019-02-25 PROCEDURE — 3008F BODY MASS INDEX DOCD: CPT | Performed by: FAMILY MEDICINE

## 2019-02-25 PROCEDURE — 99213 OFFICE O/P EST LOW 20 MIN: CPT | Performed by: FAMILY MEDICINE

## 2019-02-25 NOTE — TELEPHONE ENCOUNTER
Can you double check into this  I believe the pharmacy called in state that they do not have N P thyroid and they recommended another 1,  not levothyroxine    Can you please double-checked a messages

## 2019-02-25 NOTE — TELEPHONE ENCOUNTER
I spoke to [facility] pharmacy at [phone], they do not have NP thyroid in stock but this can be ordered under 2 conditions, request to change medication to levothyroxine which the patient is not agreeable to, and we need to provide a clinical reason why the patient cannot switch  Patient informed the pharmacist his blood levels fluctuate and were not stable on levothyroxine in the past, pharmacy just needs to hear this information from us  Agreeable?

## 2019-02-25 NOTE — TELEPHONE ENCOUNTER
Pharmacy called and stated the patient does not want to switch to levothyroxine, and would like to stick to NP Thyroid  In order for the pharmacy to fill the medication they need a specific or clinical reason as to why patient cannot take levothyroxine  Please advise

## 2019-02-27 NOTE — PROGRESS NOTES
Assessment/Plan:    Patient overall is clinically stable from the standpoint of hypothyroidism  He is due for labs and did not get them prior to today's visit  Labs ordered for now and then again in 1 year for a healthcare maintenance  Blood pressure improved today  Diagnoses and all orders for this visit:    Acquired hypothyroidism    Abnormal glucose    Pure hypercholesterolemia    Healthcare maintenance  -     Comprehensive metabolic panel; Future  -     Hemoglobin A1C; Future  -     Lipid panel; Future  -     TSH, 3rd generation; Future  -     T3, free; Future        1  Acquired hypothyroidism     2  Abnormal glucose     3  Pure hypercholesterolemia     4  Healthcare maintenance  Comprehensive metabolic panel    Hemoglobin A1C    Lipid panel    TSH, 3rd generation    T3, free       Subjective:        Patient ID: Cayetano Sky is a 50 y o  male  Chief Complaint   Patient presents with    Follow-up     6 months;       Patient here for recheck thyroid  Did not get labs done  Overall feels well  The following portions of the patient's history were reviewed and updated as appropriate: past medical history, past surgical history and problem list       Review of Systems   Constitutional: Negative for fatigue  Respiratory: Negative  Cardiovascular: Negative  Neurological: Negative for dizziness  Objective:  /82 (BP Location: Left arm, Patient Position: Sitting, Cuff Size: Standard)   Pulse 81   Ht 5' 11" (1 803 m)   Wt 93 2 kg (205 lb 6 4 oz)   SpO2 97%   BMI 28 65 kg/m²        Physical Exam   Constitutional: He is oriented to person, place, and time  He appears well-nourished  No distress  HENT:   Head: Normocephalic  Cardiovascular: Normal rate, regular rhythm and normal heart sounds  Pulmonary/Chest: Effort normal  He has no wheezes  Musculoskeletal: He exhibits no edema  Lymphadenopathy:     He has no cervical adenopathy     Neurological: He is alert and oriented to person, place, and time  Nursing note and vitals reviewed  BMI Counseling: Body mass index is 28 65 kg/m²  Discussed the patient's BMI with him  The BMI is above average  BMI counseling and education was provided to the patient  Nutrition recommendations include decreasing overall calorie intake  Exercise recommendations include exercising 3-5 times per week

## 2019-02-27 NOTE — PATIENT INSTRUCTIONS
Low Fat Diet   AMBULATORY CARE:   A low-fat diet  is an eating plan that is low in total fat, unhealthy fat, and cholesterol  You may need to follow a low-fat diet if you have trouble digesting or absorbing fat  You may also need to follow this diet if you have high cholesterol  You can also lower your cholesterol by increasing the amount of fiber in your diet  Soluble fiber is a type of fiber that helps to decrease cholesterol levels  Different types of fat in food:   · Limit unhealthy fats  A diet that is high in cholesterol, saturated fat, and trans fat may cause unhealthy cholesterol levels  Unhealthy cholesterol levels increase your risk of heart disease  ¨ Cholesterol:  Limit intake of cholesterol to less than 200 mg per day  Cholesterol is found in meat, eggs, and dairy  ¨ Saturated fat:  Limit saturated fat to less than 7% of your total daily calories  Ask your dietitian how many calories you need each day  Saturated fat is found in butter, cheese, ice cream, whole milk, and palm oil  Saturated fat is also found in meat, such as beef, pork, chicken skin, and processed meats  Processed meats include sausage, hot dogs, and bologna  ¨ Trans fat:  Avoid trans fat as much as possible  Trans fat is used in fried and baked foods  Foods that say trans fat free on the label may still have up to 0 5 grams of trans fat per serving  · Include healthy fats  Replace foods that are high in saturated and trans fat with foods high in healthy fats  This may help to decrease high cholesterol levels  ¨ Monounsaturated fats: These are found in avocados, nuts, and vegetable oils, such as olive, canola, and sunflower oil  ¨ Polyunsaturated fats: These can be found in vegetable oils, such as soybean or corn oil  Omega-3 fats can help to decrease the risk of heart disease  Omega-3 fats are found in fish, such as salmon, herring, trout, and tuna   Omega-3 fats can also be found in plant foods, such as walnuts, flaxseed, soybeans, and canola oil    Foods to limit or avoid:   · Grains:      ¨ Snacks that are made with partially hydrogenated oils, such as chips, regular crackers, and butter-flavored popcorn    ¨ High-fat baked goods, such as biscuits, croissants, doughnuts, pies, cookies, and pastries    · Dairy:      ¨ Whole milk, 2% milk, and yogurt and ice cream made with whole milk    ¨ Half and half creamer, heavy cream, and whipping cream    ¨ Cheese, cream cheese, and sour cream    · Meats and proteins:      ¨ High-fat cuts of meat (T-bone steak, regular hamburger, and ribs)    ¨ Fried meat, poultry (turkey and chicken), and fish    ¨ Poultry (chicken and turkey) with skin    ¨ Cold cuts (salami or bologna), hot dogs, ventura, and sausage    ¨ Whole eggs and egg yolks    · Vegetables and fruits with added fat:      ¨ Fried vegetables or vegetables in butter or high-fat sauces, such as cream or cheese sauces    ¨ Fried fruit or fruit served with butter or cream    · Fats:      ¨ Butter, stick margarine, and shortening    ¨ Coconut, palm oil, and palm kernel oil  Foods to include:   · Grains:      ¨ Whole-grain breads, cereals, pasta, and brown rice    ¨ Low-fat crackers and pretzels    · Vegetables and fruits:      ¨ Fresh, frozen, or canned vegetables (no salt or low-sodium)    ¨ Fresh, frozen, dried, or canned fruit (canned in light syrup or fruit juice)    ¨ Avocado    · Low-fat dairy products:      ¨ Nonfat (skim) or 1% milk    ¨ Nonfat or low-fat cheese, yogurt, and cottage cheese    · Meats and proteins:      ¨ Chicken or turkey with no skin    ¨ Baked or broiled fish    ¨ Lean beef and pork (loin, round, extra lean hamburger)    ¨ Beans and peas, unsalted nuts, soy products    ¨ Egg whites and substitutes    ¨ Seeds and nuts    · Fats:      ¨ Unsaturated oil, such as canola, olive, peanut, soybean, or sunflower oil    ¨ Soft or liquid margarine and vegetable oil spread    ¨ Low-fat salad dressing  Other ways to decrease fat:   · Read food labels before you buy foods  Choose foods that have less than 30% of calories from fat  Choose low-fat or fat-free dairy products  Remember that fat free does not mean calorie free  These foods still contain calories, and too many calories can lead to weight gain  · Trim fat from meat and avoid fried food  Trim all visible fat from meat before you cook it  Remove the skin from poultry  Do not gutierres meat, fish, or poultry  Bake, roast, boil, or broil these foods instead  Avoid fried foods  Eat a baked potato instead of Western Trisha fries  Steam vegetables instead of sautéing them in butter  · Add less fat to foods  Use imitation ventura bits on salads and baked potatoes instead of regular ventura bits  Use fat-free or low-fat salad dressings instead of regular dressings  Use low-fat or nonfat butter-flavored topping instead of regular butter or margarine on popcorn and other foods  Ways to decrease fat in recipes:  Replace high-fat ingredients with low-fat or nonfat ones  This may cause baked goods to be drier than usual  You may need to use nonfat cooking spray on pans to prevent food from sticking  You also may need to change the amount of other ingredients, such as water, in the recipe  Try the following:  · Use low-fat or light margarine instead of regular margarine or shortening  · Use lean ground turkey breast or chicken, or lean ground beef (less than 5% fat) instead of hamburger  · Add 1 teaspoon of canola oil to 8 ounces of skim milk instead of using cream or half and half  · Use grated zucchini, carrots, or apples in breads instead of coconut  · Use blenderized, low-fat cottage cheese, plain tofu, or low-fat ricotta cheese instead of cream cheese  · Use 1 egg white and 1 teaspoon of canola oil, or use ¼ cup (2 ounces) of fat-free egg substitute instead of a whole egg       · Replace half of the oil that is called for in a recipe with applesauce when you bake  Use 3 tablespoons of cocoa powder and 1 tablespoon of canola oil instead of a square of baking chocolate  How to increase fiber:  Eat enough high-fiber foods to get 20 to 30 grams of fiber every day  Slowly increase your fiber intake to avoid stomach cramps, gas, and other problems  · Eat 3 ounces of whole-grain foods each day  An ounce is about 1 slice of bread  Eat whole-grain breads, such as whole-wheat bread  Whole wheat, whole-wheat flour, or other whole grains should be listed as the first ingredient on the food label  Replace white flour with whole-grain flour or use half of each in recipes  Whole-grain flour is heavier than white flour, so you may have to add more yeast or baking powder  · Eat a high-fiber cereal for breakfast   Oatmeal is a good source of soluble fiber  Look for cereals that have bran or fiber in the name  Choose whole-grain products, such as brown rice, barley, and whole-wheat pasta  · Eat more beans, peas, and lentils  For example, add beans to soups or salads  Eat at least 5 cups of fruits and vegetables each day  Eat fruits and vegetables with the peel because the peel is high in fiber  © 2017 [employer] Information is for End User's use only and may not be sold, redistributed or otherwise used for commercial purposes  All illustrations and images included in CareNotes® are the copyrighted property of A D A M [employer]  or Bladimir Hernandez  The above information is an  only  It is not intended as medical advice for individual conditions or treatments  Talk to your doctor, nurse or pharmacist before following any medical regimen to see if it is safe and effective for you  Heart Healthy Diet   AMBULATORY CARE:   A heart healthy diet  is an eating plan low in total fat, unhealthy fats, and sodium (salt)  A heart healthy diet helps decrease your risk for heart disease and stroke   Limit the amount of fat you eat to 25% to 35% of your total daily calories  Limit sodium to less than 2,300 mg each day  Healthy fats:  Healthy fats can help improve cholesterol levels  The risk for heart disease is decreased when cholesterol levels are normal  Choose healthy fats, such as the following:  · Unsaturated fat  is found in foods such as soybean, canola, olive, corn, and safflower oils  It is also found in soft tub margarine that is made with liquid vegetable oil  · Omega-3 fat  is found in certain fish, such as salmon, tuna, and trout, and in walnuts and flaxseed  Unhealthy fats:  Unhealthy fats can cause unhealthy cholesterol levels in your blood and increase your risk of heart disease  Limit unhealthy fats, such as the following:  · Cholesterol  is found in animal foods, such as eggs and lobster, and in dairy products made from whole milk  Limit cholesterol to less than 300 milligrams (mg) each day  You may need to limit cholesterol to 200 mg each day if you have heart disease  · Saturated fat  is found in meats, such as ventura and hamburger  It is also found in chicken or turkey skin, whole milk, and butter  Limit saturated fat to less than 7% of your total daily calories  Limit saturated fat to less than 6% if you have heart disease or are at increased risk for it  · Trans fat  is found in packaged foods, such as potato chips and cookies  It is also in hard margarine, some fried foods, and shortening  Avoid trans fats as much as possible    Heart healthy foods and drinks to include:  Ask your dietitian or healthcare provider how many servings to have from each of the following food groups:  · Grains:      ¨ Whole-wheat breads, cereals, and pastas, and brown rice    ¨ Low-fat, low-sodium crackers and chips    · Vegetables:      ¨ Broccoli, green beans, green peas, and spinach    ¨ Collards, kale, and lima beans    ¨ Carrots, sweet potatoes, tomatoes, and peppers    ¨ Canned vegetables with no salt added    · Fruits:      ¨ Bananas, peaches, pears, and pineapple    ¨ Grapes, raisins, and dates    ¨ Oranges, tangerines, grapefruit, orange juice, and grapefruit juice    ¨ Apricots, mangoes, melons, and papaya    ¨ Raspberries and strawberries    ¨ Canned fruit with no added sugar    · Low-fat dairy products:      ¨ Nonfat (skim) milk, 1% milk, and low-fat almond, cashew, or soy milks fortified with calcium    ¨ Low-fat cheese, regular or frozen yogurt, and cottage cheese    · Meats and proteins , such as lean cuts of beef and pork (loin, leg, round), skinless chicken and turkey, legumes, soy products, egg whites, and nuts  Foods and drinks to limit or avoid:  Ask your dietitian or healthcare provider about these and other foods that are high in unhealthy fat, sodium, and sugar:  · Snack or packaged foods , such as frozen dinners, cookies, macaroni and cheese, and cereals with more than 300 mg of sodium per serving    · Canned or dry mixes  for cakes, soups, sauces, or gravies    · Vegetables with added sodium , such as instant potatoes, vegetables with added sauces, or regular canned vegetables    · Other foods high in sodium , such as ketchup, barbecue sauce, salad dressing, pickles, olives, soy sauce, and miso    · High-fat dairy foods  such as whole or 2% milk, cream cheese, or sour cream, and cheeses     · High-fat protein foods  such as high-fat cuts of beef (T-bone steaks, ribs), chicken or turkey with skin, and organ meats, such as liver    · Cured or smoked meats , such as hot dogs, ventura, and sausage    · Unhealthy fats and oils , such as butter, stick margarine, shortening, and cooking oils such as coconut or palm oil    · Food and drinks high in sugar , such as soft drinks (soda), sports drinks, sweetened tea, candy, cake, cookies, pies, and doughnuts  Other diet guidelines to follow:   · Eat more foods containing omega-3 fats  Eat fish high in omega-3 fats at least 2 times a week  · Limit alcohol    Too much alcohol can damage your heart and raise your blood pressure  Women should limit alcohol to 1 drink a day  Men should limit alcohol to 2 drinks a day  A drink of alcohol is 12 ounces of beer, 5 ounces of wine, or 1½ ounces of liquor  · Choose low-sodium foods  High-sodium foods can lead to high blood pressure  Add little or no salt to food you prepare  Use herbs and spices in place of salt  · Eat more fiber  to help lower cholesterol levels  Eat at least 5 servings of fruits and vegetables each day  Eat 3 ounces of whole-grain foods each day  Legumes (beans) are also a good source of fiber  Lifestyle guidelines:   · Do not smoke  Nicotine and other chemicals in cigarettes and cigars can cause lung and heart damage  Ask your healthcare provider for information if you currently smoke and need help to quit  E-cigarettes or smokeless tobacco still contain nicotine  Talk to your healthcare provider before you use these products  · Exercise regularly  to help you maintain a healthy weight and improve your blood pressure and cholesterol levels  Ask your healthcare provider about the best exercise plan for you  Do not start an exercise program without asking your healthcare provider  Follow up with your healthcare provider as directed:  Write down your questions so you remember to ask them during your visits  © 2017 [address] [facility] Information is for End User's use only and may not be sold, redistributed or otherwise used for commercial purposes  All illustrations and images included in CareNotes® are the copyrighted property of [employer]  or Bladimir Hernandez  The above information is an  only  It is not intended as medical advice for individual conditions or treatments  Talk to your doctor, nurse or pharmacist before following any medical regimen to see if it is safe and effective for you  Calorie Counting Diet   WHAT YOU NEED TO KNOW:   What is a calorie counting diet?   It is a meal plan based on counting calories each day to reach a healthy body weight  You will need to eat fewer calories if you are trying to lose weight  Weight loss may decrease your risk for certain health problems or improve your health if you have health problems  Some of these health problems include heart disease, high blood pressure, and diabetes  What foods should I avoid? Your dietitian will tell you if you need to avoid certain foods based on your body weight and health condition  You may need to avoid high-fat foods if you are at risk for or have heart disease  You may need to eat fewer foods from the breads and starches food group if you have diabetes  How many calories are in foods? The following is a list of foods and drinks with the approximate number of calories in each  Check the food label to find the exact number of calories  A dietitian can tell you how many calories you should have from each food group each day    · Carbohydrate:      ¨ ½ of a 3-inch bagel, 1 slice of bread, or ½ of a hamburger bun or hot dog bun (80)    ¨ 1 (8-inch) flour tortilla or ½ cup of cooked rice (100)    ¨ 1 (6-inch) corn tortilla (80)    ¨ 1 (6-inch) pancake or 1 cup of bran flakes cereal (110)    ¨ ½ cup of cooked cereal (80)    ¨ ½ cup of cooked pasta (85)    ¨ 1 ounce of pretzels (100)    ¨ 3 cups of air-popped popcorn without butter or oil (80)    · Dairy:      ¨ 1 cup of skim or 1% milk (90)    ¨ 1 cup of 2% milk (120)    ¨ 1 cup of whole milk (160)    ¨ 1 cup of 2% chocolate milk (220)    ¨ 1 ounce of low-fat cheese with 3 grams of fat per ounce (70)    ¨ 1 ounce of cheddar cheese (114)    ¨ ½ cup of 1% fat cottage cheese (80)    ¨ 1 cup of plain or sugar-free, fat-free yogurt (90)    · Protein foods:      ¨ 3 ounces of fish (not breaded or fried) (95)    ¨ 3 ounces of breaded, fried fish (195)    ¨ ¾ cup of tuna canned in water (105)    ¨ 3 ounces of chicken breast without skin (105)    ¨ 1 fried chicken breast with skin (350)    ¨ ¼ cup of fat free egg substitute (40)    ¨ 1 large egg (75)    ¨ 3 ounces of lean beef or pork (165)    ¨ 3 ounces of fried pork chop or ham (185)    ¨ ½ cup of cooked dried beans, such as kidney, kennedy, lentils, or navy (115)    ¨ 3 ounces of bologna or lunch meat (225)    ¨ 2 links of breakfast sausage (140)    · Vegetables:      ¨ ½ cup of sliced mushrooms (10)    ¨ 1 cup of salad greens, such as lettuce, spinach, or hermelinda (15)    ¨ ½ cup of steamed asparagus (20)    ¨ ½ cup of cooked summer squash, zucchini squash, or green or wax beans (25)    ¨ 1 cup of broccoli or cauliflower florets, or 1 medium tomato (25)    ¨ 1 large raw carrot or ½ cup of cooked carrots (40)    ¨ ? of a medium cucumber or 1 stalk of celery (5)    ¨ 1 small baked potato (160)    ¨ 1 cup of breaded, fried vegetables (230)    · Fruit:      ¨ 1 (6-inch) banana (55)     ¨ ½ of a 4-inch grapefruit (55)    ¨ 15 grapes (60)    ¨ 1 medium orange or apple (70)    ¨ 1 large peach (65)    ¨ 1 cup of fresh pineapple chunks (75)    ¨ 1 cup of melon cubes (50)    ¨ 1¼ cups of whole strawberries (45)    ¨ ½ cup of fruit canned in juice (55)    ¨ ½ cup of fruit canned in heavy syrup (110)    ¨ ?  cup of raisins (130)    ¨ ½ cup of unsweetened fruit juice (60)    ¨ ½ cup of grape, cranberry, or prune juice (90)    · Fat:      ¨ 10 peanuts or 2 teaspoons of peanut butter (55)    ¨ 2 tablespoons of avocado or 1 tablespoon of regular salad dressing (45)    ¨ 2 slices of ventura (90)    ¨ 1 teaspoon of oil, such as safflower, canola, corn, or olive oil (45)    ¨ 2 teaspoons of low-fat margarine, or 1 tablespoon of low-fat mayonnaise (50)    ¨ 1 teaspoon of regular margarine (40)    ¨ 1 tablespoon of regular mayonnaise (135)    ¨ 1 tablespoon of cream cheese or 2 tablespoons of low-fat cream cheese (45)    ¨ 2 tablespoons of vegetable shortening (215)    · Dessert and sweets:      ¨ 8 animal crackers or 5 vanilla wafers (80)    ¨ 1 frozen fruit juice bar (80)    ¨ ½ cup of ice milk or low-fat frozen yogurt (90)    ¨ ½ cup of sherbet or sorbet (125)    ¨ ½ cup of sugar-free pudding or custard (60)    ¨ ½ cup of ice cream (140)    ¨ ½ cup of pudding or custard (175)    ¨ 1 (2-inch) square chocolate brownie (185)    · Combination foods:      ¨ Bean burrito made with an 8-inch tortilla, without cheese (275)    ¨ Chicken breast sandwich with lettuce and tomato (325)    ¨ 1 cup of chicken noodle soup (60)    ¨ 1 beef taco (175)    ¨ Regular hamburger with lettuce and tomato (310)    ¨ Regular cheeseburger with lettuce and tomato (410)     ¨ ¼ of a 12-inch cheese pizza (280)    ¨ Fried fish sandwich with lettuce and tomato (425)    ¨ Hot dog and bun (275)    ¨ 1½ cups of macaroni and cheese (310)    ¨ Taco salad with a fried tortilla shell (870)    · Low-calorie foods:      ¨ 1 tablespoon of ketchup or 1 tablespoon of fat free sour cream (15)    ¨ 1 teaspoon of mustard (5)    ¨ ¼ cup of salsa (20)    ¨ 1 large dill pickle (15)    ¨ 1 tablespoon of fat free salad dressing (10)    ¨ 2 teaspoons of low-sugar, light jam or jelly, or 1 tablespoon of sugar-free syrup (15)    ¨ 1 sugar-free popsicle (15)    ¨ 1 cup of club soda, seltzer water, or diet soda (0)  CARE AGREEMENT:   You have the right to help plan your care  Discuss treatment options with your caregivers to decide what care you want to receive  You always have the right to refuse treatment  The above information is an  only  It is not intended as medical advice for individual conditions or treatments  Talk to your doctor, nurse or pharmacist before following any medical regimen to see if it is safe and effective for you  © 2017 [address] [employer] Information is for End User's use only and may not be sold, redistributed or otherwise used for commercial purposes   All illustrations and images included in CareNotes® are the copyrighted property of [employer]  or [employer]

## 2019-03-18 DIAGNOSIS — K58.9 IRRITABLE BOWEL SYNDROME, UNSPECIFIED TYPE: ICD-10-CM

## 2019-03-18 RX ORDER — DICYCLOMINE HCL 20 MG
TABLET ORAL
Qty: 40 TABLET | Refills: 1 | Status: SHIPPED | OUTPATIENT
Start: 2019-03-18 | End: 2019-06-23 | Stop reason: SDUPTHER

## 2019-05-31 DIAGNOSIS — E03.9 ACQUIRED HYPOTHYROIDISM: ICD-10-CM

## 2019-05-31 RX ORDER — LEVOTHYROXINE, LIOTHYRONINE 38; 9 UG/1; UG/1
TABLET ORAL
Qty: 180 TABLET | Refills: 2 | Status: SHIPPED | OUTPATIENT
Start: 2019-05-31 | End: 2020-03-02

## 2019-06-23 DIAGNOSIS — K58.9 IRRITABLE BOWEL SYNDROME, UNSPECIFIED TYPE: ICD-10-CM

## 2019-06-24 RX ORDER — DICYCLOMINE HCL 20 MG
TABLET ORAL
Qty: 40 TABLET | Refills: 1 | Status: SHIPPED | OUTPATIENT
Start: 2019-06-24 | End: 2019-08-26

## 2019-07-17 ENCOUNTER — TELEPHONE (OUTPATIENT)
Dept: FAMILY MEDICINE CLINIC | Facility: CLINIC | Age: 49
End: 2019-07-17

## 2019-07-17 DIAGNOSIS — R05.9 COUGH: Primary | ICD-10-CM

## 2019-07-17 RX ORDER — BENZONATATE 200 MG/1
200 CAPSULE ORAL 3 TIMES DAILY PRN
Qty: 20 CAPSULE | Refills: 0 | Status: SHIPPED | OUTPATIENT
Start: 2019-07-17 | End: 2019-08-26

## 2019-07-17 NOTE — TELEPHONE ENCOUNTER
I called and left a message for Juliana Lam on his cell phone consent ok  informing Chrystie Bison called in prescription cough gel caps that are 3 times a days as needed for cough confirmed pharmacy if pt has any questions and or concerns to please call the office

## 2019-07-17 NOTE — TELEPHONE ENCOUNTER
Cesario Marroquin called the office he just is getting over a cold  Pt has a lingering cough  Pt was asking if you could send a script to the [facility] in [place]  Pharmacy for him he leaves for vacation on Friday   Pt's number is [phone]

## 2019-07-25 ENCOUNTER — APPOINTMENT (OUTPATIENT)
Dept: LAB | Age: 49
End: 2019-07-25
Payer: COMMERCIAL

## 2019-07-25 DIAGNOSIS — Z00.00 HEALTHCARE MAINTENANCE: ICD-10-CM

## 2019-07-25 LAB
ALBUMIN SERPL BCP-MCNC: 3.7 G/DL (ref 3.5–5)
ALP SERPL-CCNC: 61 U/L (ref 46–116)
ALT SERPL W P-5'-P-CCNC: 40 U/L (ref 12–78)
ANION GAP SERPL CALCULATED.3IONS-SCNC: 3 MMOL/L (ref 4–13)
AST SERPL W P-5'-P-CCNC: 17 U/L (ref 5–45)
BILIRUB SERPL-MCNC: 0.37 MG/DL (ref 0.2–1)
BUN SERPL-MCNC: 13 MG/DL (ref 5–25)
CALCIUM SERPL-MCNC: 9.1 MG/DL (ref 8.3–10.1)
CHLORIDE SERPL-SCNC: 106 MMOL/L (ref 100–108)
CHOLEST SERPL-MCNC: 165 MG/DL (ref 50–200)
CO2 SERPL-SCNC: 29 MMOL/L (ref 21–32)
CREAT SERPL-MCNC: 1.13 MG/DL (ref 0.6–1.3)
EST. AVERAGE GLUCOSE BLD GHB EST-MCNC: 111 MG/DL
GFR SERPL CREATININE-BSD FRML MDRD: 76 ML/MIN/1.73SQ M
GLUCOSE P FAST SERPL-MCNC: 93 MG/DL (ref 65–99)
HBA1C MFR BLD: 5.5 % (ref 4.2–6.3)
HDLC SERPL-MCNC: 39 MG/DL (ref 40–60)
LDLC SERPL CALC-MCNC: 79 MG/DL (ref 0–100)
NONHDLC SERPL-MCNC: 126 MG/DL
POTASSIUM SERPL-SCNC: 4 MMOL/L (ref 3.5–5.3)
PROT SERPL-MCNC: 7.2 G/DL (ref 6.4–8.2)
SODIUM SERPL-SCNC: 138 MMOL/L (ref 136–145)
T3FREE SERPL-MCNC: 3.29 PG/ML (ref 2.3–4.2)
TRIGL SERPL-MCNC: 234 MG/DL
TSH SERPL DL<=0.05 MIU/L-ACNC: 2.57 UIU/ML (ref 0.36–3.74)

## 2019-07-25 PROCEDURE — 83036 HEMOGLOBIN GLYCOSYLATED A1C: CPT

## 2019-07-25 PROCEDURE — 80061 LIPID PANEL: CPT

## 2019-07-25 PROCEDURE — 84481 FREE ASSAY (FT-3): CPT

## 2019-07-25 PROCEDURE — 80053 COMPREHEN METABOLIC PANEL: CPT

## 2019-07-25 PROCEDURE — 36415 COLL VENOUS BLD VENIPUNCTURE: CPT

## 2019-07-25 PROCEDURE — 84443 ASSAY THYROID STIM HORMONE: CPT

## 2019-08-26 ENCOUNTER — OFFICE VISIT (OUTPATIENT)
Dept: FAMILY MEDICINE CLINIC | Facility: CLINIC | Age: 49
End: 2019-08-26
Payer: COMMERCIAL

## 2019-08-26 VITALS
HEART RATE: 80 BPM | WEIGHT: 204.8 LBS | DIASTOLIC BLOOD PRESSURE: 90 MMHG | BODY MASS INDEX: 28.67 KG/M2 | TEMPERATURE: 98.2 F | SYSTOLIC BLOOD PRESSURE: 140 MMHG | OXYGEN SATURATION: 97 % | HEIGHT: 71 IN

## 2019-08-26 DIAGNOSIS — Z00.00 HEALTH CARE MAINTENANCE: Primary | ICD-10-CM

## 2019-08-26 DIAGNOSIS — Z12.5 PROSTATE CANCER SCREENING: ICD-10-CM

## 2019-08-26 DIAGNOSIS — Z72.0 CHEWS TOBACCO: Primary | ICD-10-CM

## 2019-08-26 DIAGNOSIS — E78.2 MIXED HYPERLIPIDEMIA: ICD-10-CM

## 2019-08-26 DIAGNOSIS — R73.09 ABNORMAL GLUCOSE: ICD-10-CM

## 2019-08-26 DIAGNOSIS — E03.9 ACQUIRED HYPOTHYROIDISM: ICD-10-CM

## 2019-08-26 PROCEDURE — 99396 PREV VISIT EST AGE 40-64: CPT | Performed by: FAMILY MEDICINE

## 2019-08-26 RX ORDER — VARENICLINE TARTRATE 25 MG
KIT ORAL
Qty: 53 TABLET | Refills: 0 | Status: SHIPPED | OUTPATIENT
Start: 2019-08-26 | End: 2020-09-22

## 2019-08-28 ENCOUNTER — TELEPHONE (OUTPATIENT)
Dept: FAMILY MEDICINE CLINIC | Facility: CLINIC | Age: 49
End: 2019-08-28

## 2019-08-28 NOTE — TELEPHONE ENCOUNTER
Billy Amaya called the office his local pharmacy is the [facility] in [place] on [address]  Pt was prescribed a  pack of Chantix (starter pack) ? and pt stated it was sent in wrong per pharmacy they stated it is for 4 days not 3  Please advise  Calling [facility] to confirm what they are requesting  Called and spoke to pharmacist       On the package of Chantix it says take 0 5 mg twice a day for 4 days do you want them to label with take how directed on package?

## 2019-08-28 NOTE — TELEPHONE ENCOUNTER
It should be a full starter pack for 30 days starting with the 0 5 mg dosing and working its way to 1 mg b i d

## 2019-08-29 NOTE — TELEPHONE ENCOUNTER
I called the [facility] in [employer] and gave them the information as per [facility] requested  They stated they will label  To take as directed  on package

## 2019-08-30 NOTE — PROGRESS NOTES
Assessment/Plan:    Health Maintenance  Lifestyle Advice: follow a low fat, low cholesterol diet  Diet: limited junk food  Exercise: infrequently  Dental: regular dental visits  Vision: no vision problems  Preventative Health: Male Preventative: Cholesterol screening up to date    Yearly physical completed  Patient's blood pressure  is higher than it should be  Patient does have white coat type syndrome   I have asked him to once again check his home blood pressures and drop off numbers in the next month  Would like to get his blood pressure closer to 130/80 on a regular basis     Thyroid numbers are at goal   Cholesterol shows an LDL less than 100 but patient's triglycerides and HDL put him into a pre metabolic type profile  A1c is normal   Risk factor modification recommended  Recommend flu shot in the fall  Full labs to be ordered in 1 year  At that time patient can be referred for colonoscopy as he will be 50  Diagnoses and all orders for this visit:    Health care maintenance    Acquired hypothyroidism  -     TSH, 3rd generation; Future  -     T3, free; Future    Abnormal glucose  -     Comprehensive metabolic panel; Future  -     Hemoglobin A1C; Future    Mixed hyperlipidemia  -     Comprehensive metabolic panel; Future  -     Lipid panel; Future    Prostate cancer screening  -     PSA, Total Screen; Future                  Subjective:      Patient ID: Anamaria Islas is a 52 y o  male  70-year-old white male here for yearly physical   Overall feels well  The following portions of the patient's history were reviewed and updated as appropriate: allergies, current medications, past family history, past medical history, past social history, past surgical history and problem list     Review of Systems   Constitutional: Negative for appetite change, fatigue, fever and unexpected weight change     HENT: Negative for congestion, ear pain, postnasal drip, rhinorrhea, sinus pressure, sinus pain and sore throat  Eyes: Negative for redness and visual disturbance  Respiratory: Negative for chest tightness and shortness of breath  Cardiovascular: Negative for chest pain, palpitations and leg swelling  Gastrointestinal: Negative for abdominal distention, abdominal pain, diarrhea and nausea  Endocrine: Negative for cold intolerance and heat intolerance  Genitourinary: Negative for dysuria and hematuria  Musculoskeletal: Negative for arthralgias, gait problem and myalgias  Skin: Negative for pallor and rash  Neurological: Negative for dizziness and headaches  Psychiatric/Behavioral: Negative for behavioral problems  The patient is not nervous/anxious  Objective:    /90 (BP Location: Left arm, Patient Position: Sitting, Cuff Size: Standard)   Pulse 80   Temp 98 2 °F (36 8 °C)   Ht 5' 11" (1 803 m)   Wt 92 9 kg (204 lb 12 8 oz)   SpO2 97%   BMI 28 56 kg/m²          Physical Exam   Constitutional: He is oriented to person, place, and time  He appears well-nourished  No distress  HENT:   Head: Normocephalic and atraumatic  Right Ear: Tympanic membrane and external ear normal    Left Ear: Tympanic membrane and external ear normal    Nose: Nose normal    Mouth/Throat: Oropharynx is clear and moist    Eyes: Pupils are equal, round, and reactive to light  Conjunctivae and EOM are normal  No scleral icterus  Neck: Normal range of motion  Neck supple  No thyromegaly present  Cardiovascular: Normal rate, regular rhythm and intact distal pulses  No murmur heard  Pulses:       Carotid pulses are 0 on the right side, and 0 on the left side  Pulmonary/Chest: Effort normal and breath sounds normal  He has no wheezes  Abdominal: Soft  Bowel sounds are normal  He exhibits no distension and no mass  There is no tenderness  Musculoskeletal: Normal range of motion  He exhibits no edema  Lymphadenopathy:     He has no cervical adenopathy     Neurological: He is alert and oriented to person, place, and time  He has normal reflexes  No cranial nerve deficit  Coordination normal    Skin: Skin is warm  No pallor  Psychiatric: He has a normal mood and affect  His behavior is normal  Thought content normal    Nursing note and vitals reviewed  Patient has no known allergies  Damon Jones had no medications administered during this visit    Health Maintenance   Topic Date Due    INFLUENZA VACCINE  02/25/2020 (Originally 7/1/2019)    BMI: Followup Plan  02/27/2020    Depression Screening PHQ  08/26/2020    BMI: Adult  08/26/2020    DTaP,Tdap,and Td Vaccines (2 - Td) 01/01/2027    Pneumococcal Vaccine: 65+ Years (1 of 2 - PCV13) 03/06/2035    Pneumococcal Vaccine: Pediatrics (0 to 5 Years) and At-Risk Patients (6 to 59 Years)  Aged Out    HEPATITIS B VACCINES  Aged Out      Social History     Socioeconomic History    Marital status: /Civil Union     Spouse name: Not on file    Number of children: Not on file    Years of education: Not on file    Highest education level: Not on file   Occupational History    Not on file   Social Needs    Financial resource strain: Not on file    Food insecurity:     Worry: Not on file     Inability: Not on file    Transportation needs:     Medical: Not on file     Non-medical: Not on file   Tobacco Use    Smoking status: Former Smoker    Smokeless tobacco: Current User   Substance and Sexual Activity    Alcohol use: No     Comment: Social    Drug use: No    Sexual activity: Not on file   Lifestyle    Physical activity:     Days per week: Not on file     Minutes per session: Not on file    Stress: Not on file   Relationships    Social connections:     Talks on phone: Not on file     Gets together: Not on file     Attends [employer] service: Not on file     Active member of club or organization: Not on file     Attends meetings of clubs or organizations: Not on file     Relationship status: Not on file   [facility] Intimate partner violence:     Fear of current or ex partner: Not on file     Emotionally abused: Not on file     Physically abused: Not on file     Forced sexual activity: Not on file   Other Topics Concern    Not on file   Social History Narrative    Not on file      Family History   Problem Relation Age of Onset    No Known Problems Mother     No Known Problems Father     Colon cancer Family       Past Medical History:   Diagnosis Date    Hypothyroidism       has a past surgical history that includes Hemorrhoid surgery (12/2016)     Patient Active Problem List    Diagnosis Date Noted    Abnormal glucose 01/09/2018    Thyroid nodule 05/05/2017    Midepigastric pain 07/01/2016    Anxiety 05/26/2016    GERD without esophagitis 05/25/2016    IBS (irritable bowel syndrome) 04/14/2016    Cervical somatic dysfunction 01/15/2016    Radiculopathy 12/21/2015    Right shoulder pain 12/21/2015    Hyperlipemia 10/07/2015    Internal hemorrhoids 10/07/2015    External hemorrhoids with other complication 97/15/4291    Vitamin D deficiency 10/20/2013    Hypothyroidism 09/03/2012       Current Outpatient Medications:     NP THYROID 60 MG tablet, TAKE ONE TABLET BY MOUTH TWICE DAILY, Disp: 180 tablet, Rfl: 2    thyroid (ARMOUR) 60 MG tablet, Take 1 tablet (60 mg total) by mouth daily (Patient not taking: Reported on 2/25/2019), Disp: 30 tablet, Rfl: 5    varenicline (CHANTIX IDRIS) 0 5 MG X 11 & 1 MG X 42 tablet, Take one 0 5mg tab by mouth 1x daily for 3 days, then increase to one 0 5mg tab 2x daily for 3 days, then increase to one 1mg tab 2x daily, Disp: 53 tablet, Rfl: 0

## 2019-12-17 ENCOUNTER — OFFICE VISIT (OUTPATIENT)
Dept: FAMILY MEDICINE CLINIC | Facility: CLINIC | Age: 49
End: 2019-12-17
Payer: COMMERCIAL

## 2019-12-17 VITALS
HEIGHT: 71 IN | WEIGHT: 207 LBS | SYSTOLIC BLOOD PRESSURE: 148 MMHG | DIASTOLIC BLOOD PRESSURE: 80 MMHG | OXYGEN SATURATION: 98 % | BODY MASS INDEX: 28.98 KG/M2 | TEMPERATURE: 98.6 F | HEART RATE: 92 BPM

## 2019-12-17 DIAGNOSIS — R10.30 LOWER ABDOMINAL PAIN: Primary | ICD-10-CM

## 2019-12-17 DIAGNOSIS — R10.9 RIGHT FLANK PAIN: ICD-10-CM

## 2019-12-17 DIAGNOSIS — R03.0 ELEVATED BLOOD PRESSURE READING: ICD-10-CM

## 2019-12-17 DIAGNOSIS — R39.15 URINARY URGENCY: ICD-10-CM

## 2019-12-17 LAB
SL AMB  POCT GLUCOSE, UA: NORMAL
SL AMB LEUKOCYTE ESTERASE,UA: NORMAL
SL AMB POCT BILIRUBIN,UA: NORMAL
SL AMB POCT BLOOD,UA: NORMAL
SL AMB POCT CLARITY,UA: CLEAR
SL AMB POCT COLOR,UA: YELLOW
SL AMB POCT KETONES,UA: NORMAL
SL AMB POCT NITRITE,UA: NORMAL
SL AMB POCT PH,UA: 7
SL AMB POCT SPECIFIC GRAVITY,UA: 1.02
SL AMB POCT URINE PROTEIN: NORMAL
SL AMB POCT UROBILINOGEN: 0.2

## 2019-12-17 PROCEDURE — 99214 OFFICE O/P EST MOD 30 MIN: CPT | Performed by: FAMILY MEDICINE

## 2019-12-17 PROCEDURE — 3008F BODY MASS INDEX DOCD: CPT | Performed by: FAMILY MEDICINE

## 2019-12-17 PROCEDURE — 81003 URINALYSIS AUTO W/O SCOPE: CPT | Performed by: FAMILY MEDICINE

## 2019-12-17 RX ORDER — CIPROFLOXACIN 500 MG/1
500 TABLET, FILM COATED ORAL EVERY 12 HOURS SCHEDULED
Qty: 10 TABLET | Refills: 0 | Status: SHIPPED | OUTPATIENT
Start: 2019-12-17 | End: 2019-12-22

## 2019-12-17 NOTE — PROGRESS NOTES
Assessment/Plan:  Chief Complaint   Patient presents with    Back Pain     R lower back radiating into RLQ; no f/c indicated  no urinary symptoms, fall or injury indicated  Patient Instructions   Here for right low back and lower abdominal pressure  Check labs as directed and urine dip in office today was wnl and await culture and sensitivity and also check CT scan abdomen and pelvis to r/o kidney stones or appendicitis if not better, pt  Prefers to wait to see how he does this week while on abx as he feels better today and no pain, he thinks it could be gas  Increase fluids and start abx as directed to cover for UTI  Stay well hydrated, call if worse or ER if pain worse or fever  Update in office this week with progress  No problem-specific Assessment & Plan notes found for this encounter  Diagnoses and all orders for this visit:    Lower abdominal pain  -     ciprofloxacin (CIPRO) 500 mg tablet; Take 1 tablet (500 mg total) by mouth every 12 (twelve) hours for 5 days  -     Comprehensive metabolic panel; Future  -     CBC and differential; Future    Right flank pain  -     ciprofloxacin (CIPRO) 500 mg tablet; Take 1 tablet (500 mg total) by mouth every 12 (twelve) hours for 5 days  -     Comprehensive metabolic panel; Future  -     CBC and differential; Future    Elevated blood pressure reading    Urinary urgency          Subjective:      Patient ID: Osmany Colón is a 52 y o  male  Back Pain (R lower back radiating into RLQ; no f/c indicated  no urinary symptoms, fall or injury indicated  ) Started yesterday into this am and is 4/10  Has urinary frequency and feels like he has to go go to the bathroom all the time  He also denies any rectal bleeding or hematuria         The following portions of the patient's history were reviewed and updated as appropriate: allergies, current medications, past family history, past medical history, past social history, past surgical history and problem list     Review of Systems   Constitutional: Negative  HENT: Negative  Eyes: Negative  Respiratory: Negative  Cardiovascular: Negative  Gastrointestinal:        Lower abdominal pain and right low back area  Endocrine: Negative  Genitourinary: Positive for urgency  Negative for frequency  Musculoskeletal: Negative  Skin: Negative  Allergic/Immunologic: Negative  Neurological: Negative  Hematological: Negative  Psychiatric/Behavioral: Negative  Objective:      /80 (BP Location: Left arm, Patient Position: Sitting, Cuff Size: Standard)   Pulse 92   Temp 98 6 °F (37 °C) (Temporal)   Ht 5' 11" (1 803 m)   Wt 93 9 kg (207 lb)   SpO2 98%   BMI 28 87 kg/m²          Physical Exam   Constitutional: He is oriented to person, place, and time  He appears well-developed and well-nourished  HENT:   Head: Normocephalic and atraumatic  Right Ear: External ear normal    Left Ear: External ear normal    Nose: Nose normal    Mouth/Throat: Oropharynx is clear and moist    Eyes: Pupils are equal, round, and reactive to light  Conjunctivae and EOM are normal    Neck: Normal range of motion  Neck supple  Cardiovascular: Normal rate, regular rhythm, normal heart sounds and intact distal pulses  Pulmonary/Chest: Effort normal and breath sounds normal    Abdominal: Soft  Bowel sounds are normal    Right lower flank pain and lower abdominal and pelvic pressure  Musculoskeletal: Normal range of motion  Neurological: He is alert and oriented to person, place, and time  He has normal reflexes  Skin: Skin is warm and dry  Psychiatric: He has a normal mood and affect   His behavior is normal

## 2019-12-17 NOTE — PATIENT INSTRUCTIONS
Here for right low back and lower abdominal pressure  Check labs as directed and urine dip in office today was wnl and await culture and sensitivity and also check CT scan abdomen and pelvis to r/o kidney stones or appendicitis if not better, pt  Prefers to wait to see how he does this week while on abx as he feels better today and no pain, he thinks it could be gas  Increase fluids and start abx as directed to cover for UTI  Stay well hydrated, call if worse or ER if pain worse or fever  Update in office this week with progress

## 2019-12-18 ENCOUNTER — APPOINTMENT (EMERGENCY)
Dept: CT IMAGING | Facility: HOSPITAL | Age: 49
End: 2019-12-18
Payer: COMMERCIAL

## 2019-12-18 ENCOUNTER — HOSPITAL ENCOUNTER (EMERGENCY)
Facility: HOSPITAL | Age: 49
Discharge: HOME | End: 2019-12-18
Attending: EMERGENCY MEDICINE | Admitting: EMERGENCY MEDICINE
Payer: COMMERCIAL

## 2019-12-18 ENCOUNTER — TELEPHONE (OUTPATIENT)
Dept: FAMILY MEDICINE CLINIC | Facility: CLINIC | Age: 49
End: 2019-12-18

## 2019-12-18 VITALS
OXYGEN SATURATION: 96 % | TEMPERATURE: 97.9 F | DIASTOLIC BLOOD PRESSURE: 88 MMHG | SYSTOLIC BLOOD PRESSURE: 124 MMHG | WEIGHT: 203.48 LBS | BODY MASS INDEX: 28.38 KG/M2 | RESPIRATION RATE: 18 BRPM | HEART RATE: 96 BPM

## 2019-12-18 DIAGNOSIS — R10.31 RIGHT LOWER QUADRANT ABDOMINAL PAIN: Primary | ICD-10-CM

## 2019-12-18 DIAGNOSIS — M54.50 ACUTE RIGHT-SIDED LOW BACK PAIN WITHOUT SCIATICA: ICD-10-CM

## 2019-12-18 DIAGNOSIS — R16.0 HYPODENSE MASS OF LIVER: ICD-10-CM

## 2019-12-18 LAB
ALBUMIN SERPL BCP-MCNC: 3.8 G/DL (ref 3.5–5)
ALP SERPL-CCNC: 61 U/L (ref 46–116)
ALT SERPL W P-5'-P-CCNC: 33 U/L (ref 12–78)
ANION GAP SERPL CALCULATED.3IONS-SCNC: 9 MMOL/L (ref 4–13)
AST SERPL W P-5'-P-CCNC: 20 U/L (ref 5–45)
BASOPHILS # BLD AUTO: 0.07 THOUSANDS/ΜL (ref 0–0.1)
BASOPHILS NFR BLD AUTO: 1 % (ref 0–1)
BILIRUB SERPL-MCNC: 0.42 MG/DL (ref 0.2–1)
BUN SERPL-MCNC: 14 MG/DL (ref 5–25)
CALCIUM SERPL-MCNC: 8.6 MG/DL (ref 8.3–10.1)
CHLORIDE SERPL-SCNC: 104 MMOL/L (ref 100–108)
CO2 SERPL-SCNC: 28 MMOL/L (ref 21–32)
CREAT SERPL-MCNC: 1.04 MG/DL (ref 0.6–1.3)
EOSINOPHIL # BLD AUTO: 0.39 THOUSAND/ΜL (ref 0–0.61)
EOSINOPHIL NFR BLD AUTO: 4 % (ref 0–6)
ERYTHROCYTE [DISTWIDTH] IN BLOOD BY AUTOMATED COUNT: 12.9 % (ref 11.6–15.1)
GFR SERPL CREATININE-BSD FRML MDRD: 84 ML/MIN/1.73SQ M
GLUCOSE SERPL-MCNC: 124 MG/DL (ref 65–140)
HCT VFR BLD AUTO: 49 % (ref 36.5–49.3)
HGB BLD-MCNC: 16.1 G/DL (ref 12–17)
IMM GRANULOCYTES # BLD AUTO: 0.03 THOUSAND/UL (ref 0–0.2)
IMM GRANULOCYTES NFR BLD AUTO: 0 % (ref 0–2)
LIPASE SERPL-CCNC: 245 U/L (ref 73–393)
LYMPHOCYTES # BLD AUTO: 1.71 THOUSANDS/ΜL (ref 0.6–4.47)
LYMPHOCYTES NFR BLD AUTO: 18 % (ref 14–44)
MCH RBC QN AUTO: 28.6 PG (ref 26.8–34.3)
MCHC RBC AUTO-ENTMCNC: 32.9 G/DL (ref 31.4–37.4)
MCV RBC AUTO: 87 FL (ref 82–98)
MONOCYTES # BLD AUTO: 0.8 THOUSAND/ΜL (ref 0.17–1.22)
MONOCYTES NFR BLD AUTO: 8 % (ref 4–12)
NEUTROPHILS # BLD AUTO: 6.69 THOUSANDS/ΜL (ref 1.85–7.62)
NEUTS SEG NFR BLD AUTO: 69 % (ref 43–75)
NRBC BLD AUTO-RTO: 0 /100 WBCS
PLATELET # BLD AUTO: 216 THOUSANDS/UL (ref 149–390)
PMV BLD AUTO: 10.2 FL (ref 8.9–12.7)
POTASSIUM SERPL-SCNC: 3.8 MMOL/L (ref 3.5–5.3)
PROT SERPL-MCNC: 7.4 G/DL (ref 6.4–8.2)
RBC # BLD AUTO: 5.62 MILLION/UL (ref 3.88–5.62)
SODIUM SERPL-SCNC: 141 MMOL/L (ref 136–145)
WBC # BLD AUTO: 9.69 THOUSAND/UL (ref 4.31–10.16)

## 2019-12-18 PROCEDURE — 36415 COLL VENOUS BLD VENIPUNCTURE: CPT | Performed by: EMERGENCY MEDICINE

## 2019-12-18 PROCEDURE — 85025 COMPLETE CBC W/AUTO DIFF WBC: CPT | Performed by: EMERGENCY MEDICINE

## 2019-12-18 PROCEDURE — 74177 CT ABD & PELVIS W/CONTRAST: CPT

## 2019-12-18 PROCEDURE — 99284 EMERGENCY DEPT VISIT MOD MDM: CPT

## 2019-12-18 PROCEDURE — 83690 ASSAY OF LIPASE: CPT | Performed by: EMERGENCY MEDICINE

## 2019-12-18 PROCEDURE — 99284 EMERGENCY DEPT VISIT MOD MDM: CPT | Performed by: EMERGENCY MEDICINE

## 2019-12-18 PROCEDURE — 80053 COMPREHEN METABOLIC PANEL: CPT | Performed by: EMERGENCY MEDICINE

## 2019-12-18 RX ADMIN — IOHEXOL 100 ML: 350 INJECTION, SOLUTION INTRAVENOUS at 02:29

## 2019-12-18 NOTE — ED PROVIDER NOTES
History  Chief Complaint   Patient presents with    Back Pain     pt was [facility] for lower back pain that radiates to his lower abdomen  Pt reports feeling bladder pressure  Denies blood in his urine or burning with urination  Pt reports he was started on abx in case of infection   Abdominal Pain     Pt is a 52year old male with a PMH of hypothyroidism presenting with abdominal pain and lower back pain x 1 day  Pt states the pain radiates from his right lower back to his abdomen is sharp 4/10 and intermittent  Denies fevers, lightheadedness, dizziness, n/v/d, urinary symptoms  Denies history of stones or abdominal surgeries  Seen by PCP and started on Cipro to cover for possible infection  Prior to Admission Medications   Prescriptions Last Dose Informant Patient Reported? Taking? NP THYROID 60 MG tablet   No Yes   Sig: TAKE ONE TABLET BY MOUTH TWICE DAILY   ciprofloxacin (CIPRO) 500 mg tablet   No Yes   Sig: Take 1 tablet (500 mg total) by mouth every 12 (twelve) hours for 5 days   thyroid (ARMOUR) 60 MG tablet   No Yes   Sig: Take 1 tablet (60 mg total) by mouth daily   varenicline (CHANTIX IDRIS) 0 5 MG X 11 & 1 MG X 42 tablet Not Taking at Unknown time  No No   Sig: Take one 0 5mg tab by mouth 1x daily for 3 days, then increase to one 0 5mg tab 2x daily for 3 days, then increase to one 1mg tab 2x daily   Patient not taking: Reported on 12/17/2019      Facility-Administered Medications: None       Past Medical History:   Diagnosis Date    Hypothyroidism        Past Surgical History:   Procedure Laterality Date    HEMORRHOID SURGERY  12/2016    [place]       Family History   Problem Relation Age of Onset    No Known Problems Mother     No Known Problems Father     Colon cancer Family      I have reviewed and agree with the history as documented      Social History     Tobacco Use    Smoking status: Former Smoker    Smokeless tobacco: Current User   Substance Use Topics    Alcohol use: No     Comment: Social    Drug use: No        Review of Systems   Constitutional: Negative for chills, diaphoresis and fever  Respiratory: Negative for cough, chest tightness and shortness of breath  Cardiovascular: Negative for chest pain  Gastrointestinal: Positive for abdominal pain  Negative for constipation, diarrhea, nausea and vomiting  Genitourinary: Negative for decreased urine volume, difficulty urinating, dysuria, flank pain, frequency, hematuria and urgency  Musculoskeletal: Positive for back pain  Neurological: Negative for dizziness and light-headedness  Physical Exam  Physical Exam   Constitutional: He is oriented to person, place, and time  He appears well-developed and well-nourished  No distress  HENT:   Head: Normocephalic and atraumatic  Right Ear: External ear normal    Left Ear: External ear normal    Nose: Nose normal    Eyes: Conjunctivae and EOM are normal    Neck: Normal range of motion  Neck supple  Cardiovascular: Normal rate, regular rhythm, normal heart sounds and intact distal pulses  Pulmonary/Chest: Effort normal and breath sounds normal    Abdominal: There is tenderness in the right lower quadrant  There is no rigidity, no rebound, no guarding and no CVA tenderness  Musculoskeletal: Normal range of motion  Neurological: He is alert and oriented to person, place, and time  Skin: Skin is warm and dry  He is not diaphoretic         Vital Signs  ED Triage Vitals [12/18/19 0114]   Temperature Pulse Respirations Blood Pressure SpO2   97 9 °F (36 6 °C) 96 18 124/88 96 %      Temp Source Heart Rate Source Patient Position - Orthostatic VS BP Location FiO2 (%)   Oral Monitor Sitting Right arm --      Pain Score       5           Vitals:    12/18/19 0114   BP: 124/88   Pulse: 96   Patient Position - Orthostatic VS: Sitting         Visual Acuity      ED Medications  Medications   iohexol (OMNIPAQUE) 350 MG/ML injection (SINGLE-DOSE) 100 mL (100 mL Intravenous Given 12/18/19 0229)       Diagnostic Studies  Results Reviewed     Procedure Component Value Units Date/Time    Comprehensive metabolic panel [[id number]] Collected:  12/18/19 0134    Lab Status:  Final result Specimen:  Blood from Arm, Left Updated:  12/18/19 0200     Sodium 141 mmol/L      Potassium 3 8 mmol/L      Chloride 104 mmol/L      CO2 28 mmol/L      ANION GAP 9 mmol/L      BUN 14 mg/dL      Creatinine 1 04 mg/dL      Glucose 124 mg/dL      Calcium 8 6 mg/dL      AST 20 U/L      ALT 33 U/L      Alkaline Phosphatase 61 U/L      Total Protein 7 4 g/dL      Albumin 3 8 g/dL      Total Bilirubin 0 42 mg/dL      eGFR 84 ml/min/1 73sq m     Narrative:       [place] guidelines for Chronic Kidney Disease (CKD):     Stage 1 with normal or high GFR (GFR > 90 mL/min/1 73 square meters)    Stage 2 Mild CKD (GFR = 60-89 mL/min/1 73 square meters)    Stage 3A Moderate CKD (GFR = 45-59 mL/min/1 73 square meters)    Stage 3B Moderate CKD (GFR = 30-44 mL/min/1 73 square meters)    Stage 4 Severe CKD (GFR = 15-29 mL/min/1 73 square meters)    Stage 5 End Stage CKD (GFR <15 mL/min/1 73 square meters)  Note: GFR calculation is accurate only with a steady state creatinine    Lipase [[id number]]  (Normal) Collected:  12/18/19 0134    Lab Status:  Final result Specimen:  Blood from Arm, Left Updated:  12/18/19 0200     Lipase 245 u/L     CBC and differential [[id number]] Collected:  12/18/19 0134    Lab Status:  Final result Specimen:  Blood from Arm, Left Updated:  12/18/19 0143     WBC 9 69 Thousand/uL      RBC 5 62 Million/uL      Hemoglobin 16 1 g/dL      Hematocrit 49 0 %      MCV 87 fL      MCH 28 6 pg      MCHC 32 9 g/dL      RDW 12 9 %      MPV 10 2 fL      Platelets 946 Thousands/uL      nRBC 0 /100 WBCs      Neutrophils Relative 69 %      Immat GRANS % 0 %      Lymphocytes Relative 18 %      Monocytes Relative 8 %      Eosinophils Relative 4 %      Basophils Relative 1 % Neutrophils Absolute 6 69 Thousands/µL      Immature Grans Absolute 0 03 Thousand/uL      Lymphocytes Absolute 1 71 Thousands/µL      Monocytes Absolute 0 80 Thousand/µL      Eosinophils Absolute 0 39 Thousand/µL      Basophils Absolute 0 07 Thousands/µL                  CT abdomen pelvis with contrast   Final Result by Yuri Baca MD (12/18 0245)      No acute pathology visualized on CT of the abdomen and pelvis with IV without oral contrast       Ill-defined 1 cm hypodensity in the right lobe of liver for which elective MRI is recommended for further evaluation  Smaller similar-appearing hypodensities are seen in the left lobe of liver         Workstation performed: [id number]                    Procedures  Procedures         ED Course  ED Course as of Dec 18 0303   Wed Dec 18, 2019   0135 Pt seen by PCP earlier for worsening back and abdominal pain  Will obtain labs and CT to rule out acute pathology  Pt comfortable at this time, offered medications but he declined  80    No acute pathology visualized on CT of the abdomen and pelvis with IV without oral contrast      Ill-defined 1 cm hypodensity in the right lobe of liver for which elective MRI is recommended for further evaluation  Smaller similar-appearing hypodensities are seen in the left lobe of liver  [facility] 0303 Pt made aware of CT findings and will follow up with PCP                                    MDM      Disposition  Final diagnoses:   Right lower quadrant abdominal pain   Acute right-sided low back pain without sciatica   Hypodense mass of liver     Time reflects when diagnosis was documented in both MDM as applicable and the Disposition within this note     Time User Action Codes Description Comment    12/18/2019  2:55 AM Rock Pace B Add [R10 31] Right lower quadrant abdominal pain     12/18/2019  2:55 AM Rock Pace B Add [M54 5] Acute right-sided low back pain without sciatica     12/18/2019  3:03 AM Rock Pace B Add [R16 0] Hypodense mass of liver       ED Disposition     ED Disposition Condition Date/Time Comment    Discharge Good Wed Dec 18, 2019  2:55 AM Claudia Heath discharge to home/self care  Follow-up Information     Follow up With Specialties Details Why Contact Info    Linda Coleman DO Family Medicine Schedule an appointment as soon as possible for a visit today  [address]   [phone]            Patient's Medications   Discharge Prescriptions    No medications on file     No discharge procedures on file      ED Provider  Electronically Signed by           Tyson Valles PA-C  12/18/19 Juan Coleman PA-C  12/18/19 0437

## 2019-12-18 NOTE — TELEPHONE ENCOUNTER
Gerber Valente called the office in regards to he was seen in the ED this morning  He is home  He just wanted to inform you he did go to the ED  since you did advise he go to the ED if symptoms worsen his results are in for his lab work  and his CT if you would like to review  Pt stated no kidney stones and no appendicitis  He does not wish to schedule a follow up at this time  He stated he thinks he  is fine he does have a little bit of pain here and there pain in back radiating to the front  He just wants you to be aware he was in the ER         Pt;s number is [phone]

## 2019-12-18 NOTE — TELEPHONE ENCOUNTER
Yes I reviewed ER note an there was an incidental finding on CT scan of which they are recommending an MRI of liver for Ill-defined 1 cm hypodensity in the right lobe of liver for which elective MRI is recommended for further evaluation  Smaller similar-appearing hypodensities are seen in the left lobe of liver    I would get this test done and get consult with [facility] for this also

## 2019-12-20 NOTE — TELEPHONE ENCOUNTER
Pt called back, he is doing well taking Motrin prn for pain  Pt states he will eventually get the MRI done, he will f/u with Gastro and keep us updated

## 2019-12-23 LAB
APPEARANCE UR: CLEAR
BACTERIA UR CULT: NORMAL
BACTERIA UR QL AUTO: NORMAL /HPF
BILIRUB UR QL STRIP: NEGATIVE
COLOR UR: YELLOW
GLUCOSE UR QL STRIP: NEGATIVE
HGB UR QL STRIP: NEGATIVE
HYALINE CASTS #/AREA URNS LPF: NORMAL /LPF
KETONES UR QL STRIP: NEGATIVE
LEUKOCYTE ESTERASE UR QL STRIP: NEGATIVE
NITRITE UR QL STRIP: NEGATIVE
PH UR STRIP: 7 [PH] (ref 5–8)
PROT UR QL STRIP: NEGATIVE
RBC #/AREA URNS HPF: NORMAL /HPF
SP GR UR STRIP: 1.02 (ref 1–1.03)
SQUAMOUS #/AREA URNS HPF: NORMAL /HPF
WBC #/AREA URNS HPF: NORMAL /HPF

## 2020-03-01 DIAGNOSIS — E03.9 ACQUIRED HYPOTHYROIDISM: ICD-10-CM

## 2020-03-02 RX ORDER — LEVOTHYROXINE, LIOTHYRONINE 38; 9 UG/1; UG/1
TABLET ORAL
Qty: 180 TABLET | Refills: 2 | Status: SHIPPED | OUTPATIENT
Start: 2020-03-02 | End: 2020-06-25

## 2020-06-24 DIAGNOSIS — E03.9 ACQUIRED HYPOTHYROIDISM: ICD-10-CM

## 2020-06-25 RX ORDER — LEVOTHYROXINE, LIOTHYRONINE 38; 9 UG/1; UG/1
TABLET ORAL
Qty: 180 TABLET | Refills: 2 | Status: SHIPPED | OUTPATIENT
Start: 2020-06-25 | End: 2020-09-11

## 2020-08-14 ENCOUNTER — APPOINTMENT (OUTPATIENT)
Dept: LAB | Age: 50
End: 2020-08-14
Payer: COMMERCIAL

## 2020-08-14 DIAGNOSIS — Z12.5 PROSTATE CANCER SCREENING: ICD-10-CM

## 2020-08-14 DIAGNOSIS — R73.09 ABNORMAL GLUCOSE: ICD-10-CM

## 2020-08-14 DIAGNOSIS — E78.2 MIXED HYPERLIPIDEMIA: ICD-10-CM

## 2020-08-14 DIAGNOSIS — E03.9 ACQUIRED HYPOTHYROIDISM: ICD-10-CM

## 2020-08-14 LAB
ALBUMIN SERPL BCP-MCNC: 3.9 G/DL (ref 3.5–5)
ALP SERPL-CCNC: 56 U/L (ref 46–116)
ALT SERPL W P-5'-P-CCNC: 40 U/L (ref 12–78)
ANION GAP SERPL CALCULATED.3IONS-SCNC: 5 MMOL/L (ref 4–13)
AST SERPL W P-5'-P-CCNC: 18 U/L (ref 5–45)
BILIRUB SERPL-MCNC: 0.51 MG/DL (ref 0.2–1)
BUN SERPL-MCNC: 13 MG/DL (ref 5–25)
CALCIUM SERPL-MCNC: 8.8 MG/DL (ref 8.3–10.1)
CHLORIDE SERPL-SCNC: 108 MMOL/L (ref 100–108)
CHOLEST SERPL-MCNC: 170 MG/DL (ref 50–200)
CO2 SERPL-SCNC: 29 MMOL/L (ref 21–32)
CREAT SERPL-MCNC: 1.24 MG/DL (ref 0.6–1.3)
EST. AVERAGE GLUCOSE BLD GHB EST-MCNC: 114 MG/DL
GFR SERPL CREATININE-BSD FRML MDRD: 67 ML/MIN/1.73SQ M
GLUCOSE P FAST SERPL-MCNC: 89 MG/DL (ref 65–99)
HBA1C MFR BLD: 5.6 %
HDLC SERPL-MCNC: 45 MG/DL
LDLC SERPL CALC-MCNC: 93 MG/DL (ref 0–100)
NONHDLC SERPL-MCNC: 125 MG/DL
POTASSIUM SERPL-SCNC: 3.9 MMOL/L (ref 3.5–5.3)
PROT SERPL-MCNC: 7.5 G/DL (ref 6.4–8.2)
PSA SERPL-MCNC: 0.5 NG/ML (ref 0–4)
SODIUM SERPL-SCNC: 142 MMOL/L (ref 136–145)
T3FREE SERPL-MCNC: 3.59 PG/ML (ref 2.3–4.2)
TRIGL SERPL-MCNC: 160 MG/DL
TSH SERPL DL<=0.05 MIU/L-ACNC: 16.9 UIU/ML (ref 0.36–3.74)

## 2020-08-14 PROCEDURE — 84443 ASSAY THYROID STIM HORMONE: CPT

## 2020-08-14 PROCEDURE — 84481 FREE ASSAY (FT-3): CPT

## 2020-08-14 PROCEDURE — 83036 HEMOGLOBIN GLYCOSYLATED A1C: CPT

## 2020-08-14 PROCEDURE — 36415 COLL VENOUS BLD VENIPUNCTURE: CPT

## 2020-08-14 PROCEDURE — 80053 COMPREHEN METABOLIC PANEL: CPT

## 2020-08-14 PROCEDURE — G0103 PSA SCREENING: HCPCS

## 2020-08-14 PROCEDURE — 80061 LIPID PANEL: CPT

## 2020-08-17 ENCOUNTER — TELEPHONE (OUTPATIENT)
Dept: FAMILY MEDICINE CLINIC | Facility: CLINIC | Age: 50
End: 2020-08-17

## 2020-08-17 NOTE — TELEPHONE ENCOUNTER
Patient saw his BW on MyChart and it shows that his TSH is elevated  He has an appt 9/16    Please advise

## 2020-08-17 NOTE — TELEPHONE ENCOUNTER
Please tell the patient that despite his TSH being elevated his T3 is stable    We will discuss this in September

## 2020-09-11 DIAGNOSIS — E03.9 ACQUIRED HYPOTHYROIDISM: ICD-10-CM

## 2020-09-11 RX ORDER — LEVOTHYROXINE, LIOTHYRONINE 38; 9 UG/1; UG/1
TABLET ORAL
Qty: 180 TABLET | Refills: 2 | Status: SHIPPED | OUTPATIENT
Start: 2020-09-11 | End: 2020-09-13 | Stop reason: SDUPTHER

## 2020-09-13 DIAGNOSIS — E03.9 ACQUIRED HYPOTHYROIDISM: ICD-10-CM

## 2020-09-13 RX ORDER — LEVOTHYROXINE AND LIOTHYRONINE 38; 9 UG/1; UG/1
60 TABLET ORAL 2 TIMES DAILY
Qty: 180 TABLET | Refills: 2 | Status: SHIPPED | OUTPATIENT
Start: 2020-09-13 | End: 2020-09-16 | Stop reason: SDUPTHER

## 2020-09-16 ENCOUNTER — OFFICE VISIT (OUTPATIENT)
Dept: FAMILY MEDICINE CLINIC | Facility: CLINIC | Age: 50
End: 2020-09-16
Payer: COMMERCIAL

## 2020-09-16 DIAGNOSIS — E03.9 ACQUIRED HYPOTHYROIDISM: ICD-10-CM

## 2020-09-16 DIAGNOSIS — Z12.5 PROSTATE CANCER SCREENING: ICD-10-CM

## 2020-09-16 DIAGNOSIS — Z00.00 HEALTHCARE MAINTENANCE: Primary | ICD-10-CM

## 2020-09-16 PROCEDURE — 1036F TOBACCO NON-USER: CPT | Performed by: FAMILY MEDICINE

## 2020-09-16 PROCEDURE — 99396 PREV VISIT EST AGE 40-64: CPT | Performed by: FAMILY MEDICINE

## 2020-09-16 PROCEDURE — 3725F SCREEN DEPRESSION PERFORMED: CPT | Performed by: FAMILY MEDICINE

## 2020-09-16 RX ORDER — LEVOTHYROXINE AND LIOTHYRONINE 38; 9 UG/1; UG/1
TABLET ORAL
Qty: 180 TABLET | Refills: 3 | Status: SHIPPED | OUTPATIENT
Start: 2020-09-16 | End: 2021-12-13 | Stop reason: SDUPTHER

## 2020-09-22 VITALS
BODY MASS INDEX: 29.26 KG/M2 | HEIGHT: 71 IN | DIASTOLIC BLOOD PRESSURE: 86 MMHG | WEIGHT: 209 LBS | OXYGEN SATURATION: 99 % | HEART RATE: 83 BPM | SYSTOLIC BLOOD PRESSURE: 130 MMHG | TEMPERATURE: 97.7 F

## 2020-09-22 NOTE — PROGRESS NOTES
Assessment/Plan:  Yearly physical completed  Patient referred for colonoscopy  Card given for Lisa Pozo  Declines flu shot  Labs reviewed  Free T3 is in range despite TS H  Patient states he feels wonderful and does not want changes thyroid medication     Health Maintenance  Lifestyle Advice: return for routine annual checkups  Diet: limited junk food  Exercise: infrequently  Dental: regular dental visits  Vision: no vision problems  Preventative Health: Male Preventative: Prostate cancer screening is up to date  Cholesterol screening up to date    No problem-specific Assessment & Plan notes found for this encounter  Diagnoses and all orders for this visit:    Healthcare maintenance  -     Comprehensive metabolic panel; Future  -     Lipid panel; Future  -     PSA, Total Screen; Future  -     T3, free; Future  -     TSH, 3rd generation; Future    Acquired hypothyroidism  -     thyroid (NP Thyroid) 60 MG tablet; TAKE 2 TABS DAILY IN AM  -     T3, free; Future  -     TSH, 3rd generation; Future    Prostate cancer screening  -     PSA, Total Screen; Future                  Subjective:      Patient ID: Lizzeth Paniagua is a 48 y o  male  Patient here for yearly physical   Is now 48years of age  No specific complaints  The following portions of the patient's history were reviewed and updated as appropriate: allergies, current medications, past family history, past medical history, past social history, past surgical history and problem list     Review of Systems   Constitutional: Negative for appetite change, fatigue, fever and unexpected weight change  HENT: Negative for congestion, ear pain, postnasal drip, rhinorrhea, sinus pressure, sinus pain and sore throat  Eyes: Negative for redness and visual disturbance  Respiratory: Negative for chest tightness and shortness of breath  Cardiovascular: Negative for chest pain, palpitations and leg swelling     Gastrointestinal: Negative for abdominal distention, abdominal pain, diarrhea and nausea  Endocrine: Negative for cold intolerance and heat intolerance  Genitourinary: Negative for dysuria and hematuria  Musculoskeletal: Negative for arthralgias, gait problem and myalgias  Skin: Negative for pallor and rash  Neurological: Negative for dizziness and headaches  Psychiatric/Behavioral: Negative for behavioral problems  The patient is not nervous/anxious  Objective:    /86   Pulse 83   Temp 97 7 °F (36 5 °C) (Temporal)   Ht 5' 11" (1 803 m)   Wt 94 8 kg (209 lb)   SpO2 99%   BMI 29 15 kg/m²          Physical Exam  Vitals signs and nursing note reviewed  Constitutional:       General: He is not in acute distress  HENT:      Head: Normocephalic and atraumatic  Eyes:      General: No scleral icterus  Conjunctiva/sclera: Conjunctivae normal       Pupils: Pupils are equal, round, and reactive to light  Neck:      Musculoskeletal: Normal range of motion and neck supple  Thyroid: No thyromegaly  Cardiovascular:      Rate and Rhythm: Normal rate and regular rhythm  Pulses:           Carotid pulses are 0 on the right side and 0 on the left side  Heart sounds: Normal heart sounds  No murmur  Pulmonary:      Effort: Pulmonary effort is normal  No respiratory distress  Breath sounds: Normal breath sounds  No wheezing  Abdominal:      General: There is no distension  Palpations: Abdomen is soft  Lymphadenopathy:      Cervical: No cervical adenopathy  Skin:     General: Skin is warm  Coloration: Skin is not pale  Neurological:      Mental Status: He is alert and oriented to person, place, and time  Cranial Nerves: No cranial nerve deficit  Deep Tendon Reflexes: Reflexes are normal and symmetric  Psychiatric:         Behavior: Behavior normal          Thought Content: Thought content normal          Judgment: Judgment normal              Patient has no known allergies      Rupal Mercer Valerie Rice had no medications administered during this visit    Health Maintenance   Topic Date Due    BMI: Followup Plan  02/27/2020    Colorectal Cancer Screening  03/06/2020    Annual Physical  08/26/2020    HIV Screening  10/22/2020 (Originally 3/6/1985)    Depression Screening PHQ  09/16/2021    BMI: Adult  09/16/2021    DTaP,Tdap,and Td Vaccines (2 - Td) 01/01/2027    Pneumococcal Vaccine: Pediatrics (0 to 5 Years) and At-Risk Patients (6 to 59 Years)  Aged Out    HIB Vaccine  Aged Out    Hepatitis B Vaccine  Aged Out    IPV Vaccine  Aged Out    Hepatitis A Vaccine  Aged Out    Meningococcal ACWY Vaccine  Aged Out    HPV Vaccine  Aged Out    Influenza Vaccine  Discontinued      Social History     Socioeconomic History    Marital status: /Civil Union     Spouse name: Not on file    Number of children: Not on file    Years of education: Not on file    Highest education level: Not on file   Occupational History    Not on file   Social Needs    Financial resource strain: Not on file    Food insecurity     Worry: Not on file     Inability: Not on file   [employer] needs     Medical: Not on file     Non-medical: Not on file   Tobacco Use    Smoking status: Former Smoker    Smokeless tobacco: Current User   Substance and Sexual Activity    Alcohol use: No     Comment: Social    Drug use: No    Sexual activity: Not on file   Lifestyle    Physical activity     Days per week: Not on file     Minutes per session: Not on file    Stress: Not on file   Relationships    Social connections     Talks on phone: Not on file     Gets together: Not on file     Attends [employer] service: Not on file     Active member of club or organization: Not on file     Attends meetings of clubs or organizations: Not on file     Relationship status: Not on file    Intimate partner violence     Fear of current or ex partner: Not on file     Emotionally abused: Not on file     Physically abused: Not on file     Forced sexual activity: Not on file   Other Topics Concern    Not on file   Social History Narrative    Not on file      Family History   Problem Relation Age of Onset    No Known Problems Mother     No Known Problems Father     Colon cancer Family       Past Medical History:   Diagnosis Date    Hypothyroidism       has a past surgical history that includes Hemorrhoid surgery (12/2016)  Patient Active Problem List    Diagnosis Date Noted    Abnormal glucose 01/09/2018    Thyroid nodule 05/05/2017    Anxiety 05/26/2016    GERD without esophagitis 05/25/2016    IBS (irritable bowel syndrome) 04/14/2016    Cervical somatic dysfunction 01/15/2016    Radiculopathy 12/21/2015    Right shoulder pain 12/21/2015    Hyperlipemia 10/07/2015    Internal hemorrhoids 10/07/2015    External hemorrhoids with other complication 04/81/4232    Vitamin D deficiency 10/20/2013    Hypothyroidism 09/03/2012       Current Outpatient Medications:     thyroid (NP Thyroid) 60 MG tablet, TAKE 2 TABS DAILY IN AM, Disp: 180 tablet, Rfl: 3              BMI Counseling: Body mass index is 29 15 kg/m²  The BMI is above normal  Nutrition recommendations include decreasing portion sizes, encouraging healthy choices of fruits and vegetables, decreasing fast food intake, consuming healthier snacks, limiting drinks that contain sugar, moderation in carbohydrate intake, increasing intake of lean protein, reducing intake of saturated and trans fat and reducing intake of cholesterol  Exercise recommendations include exercising 3-5 times per week

## 2020-11-10 ENCOUNTER — TELEPHONE (OUTPATIENT)
Dept: FAMILY MEDICINE CLINIC | Facility: CLINIC | Age: 50
End: 2020-11-10

## 2020-11-23 ENCOUNTER — TELEPHONE (OUTPATIENT)
Dept: FAMILY MEDICINE CLINIC | Facility: CLINIC | Age: 50
End: 2020-11-23

## 2021-03-30 DIAGNOSIS — Z23 ENCOUNTER FOR IMMUNIZATION: ICD-10-CM

## 2021-07-02 ENCOUNTER — OFFICE VISIT (OUTPATIENT)
Dept: FAMILY MEDICINE CLINIC | Facility: CLINIC | Age: 51
End: 2021-07-02
Payer: COMMERCIAL

## 2021-07-02 VITALS
RESPIRATION RATE: 16 BRPM | BODY MASS INDEX: 29.46 KG/M2 | WEIGHT: 210.4 LBS | OXYGEN SATURATION: 98 % | DIASTOLIC BLOOD PRESSURE: 92 MMHG | HEIGHT: 71 IN | SYSTOLIC BLOOD PRESSURE: 142 MMHG | HEART RATE: 83 BPM | TEMPERATURE: 97.5 F

## 2021-07-02 DIAGNOSIS — L03.031 PARONYCHIA OF GREAT TOE OF RIGHT FOOT: Primary | ICD-10-CM

## 2021-07-02 PROCEDURE — 1036F TOBACCO NON-USER: CPT | Performed by: FAMILY MEDICINE

## 2021-07-02 PROCEDURE — 99213 OFFICE O/P EST LOW 20 MIN: CPT | Performed by: FAMILY MEDICINE

## 2021-07-02 PROCEDURE — 3008F BODY MASS INDEX DOCD: CPT | Performed by: FAMILY MEDICINE

## 2021-07-08 NOTE — PROGRESS NOTES
Assessment/Plan:   we are deana today to get the patient to [facility] for evaluation of right paronychia of the great toe  There are no diagnoses linked to this encounter  No diagnosis found  Subjective:        Patient ID: Nancie Gonzales is a 46 y o  male  Chief Complaint   Patient presents with    Ingrown Toenail     on right foot          80-year-old male with ingrown toenail right great toe greater than 2 weeks  No drainage  Going on vacation in 1 week  The following portions of the patient's history were reviewed and updated as appropriate: past medical history, past surgical history and problem list       Review of Systems   Constitutional: Negative for chills, fatigue and fever  HENT: Negative for congestion  Respiratory: Negative for cough  Musculoskeletal: Negative for myalgias  Right foot great toe? Ingrown toenail   Psychiatric/Behavioral: The patient is not nervous/anxious  Objective:  /92 (BP Location: Left arm, Patient Position: Sitting, Cuff Size: Adult)   Pulse 83   Temp 97 5 °F (36 4 °C) (Temporal)   Resp 16   Ht 5' 11" (1 803 m)   Wt 95 4 kg (210 lb 6 4 oz)   SpO2 98%   BMI 29 34 kg/m²        Physical Exam  Vitals and nursing note reviewed  Constitutional:       General: He is not in acute distress  HENT:      Head: Normocephalic  Eyes:      General: No scleral icterus  Pupils: Pupils are equal, round, and reactive to light  Cardiovascular:      Heart sounds: Normal heart sounds  Musculoskeletal:      Right lower leg: No edema  Comments: Right foot great toe paronychia I   Skin:     Coloration: Skin is not pale  Neurological:      Mental Status: He is alert and oriented to person, place, and time  Psychiatric:         Mood and Affect: Mood normal          Behavior: Behavior normal          Thought Content:  Thought content normal

## 2021-09-12 LAB
ALBUMIN SERPL-MCNC: 4.5 G/DL (ref 3.6–5.1)
ALBUMIN/GLOB SERPL: 2 (CALC) (ref 1–2.5)
ALP SERPL-CCNC: 49 U/L (ref 35–144)
ALT SERPL-CCNC: 19 U/L (ref 9–46)
AST SERPL-CCNC: 16 U/L (ref 10–35)
BILIRUB SERPL-MCNC: 0.6 MG/DL (ref 0.2–1.2)
BUN SERPL-MCNC: 13 MG/DL (ref 7–25)
BUN/CREAT SERPL: ABNORMAL (CALC) (ref 6–22)
CALCIUM SERPL-MCNC: 9.4 MG/DL (ref 8.6–10.3)
CHLORIDE SERPL-SCNC: 107 MMOL/L (ref 98–110)
CHOLEST SERPL-MCNC: 161 MG/DL
CHOLEST/HDLC SERPL: 4 (CALC)
CO2 SERPL-SCNC: 28 MMOL/L (ref 20–32)
CREAT SERPL-MCNC: 1.07 MG/DL (ref 0.7–1.33)
GLOBULIN SER CALC-MCNC: 2.3 G/DL (CALC) (ref 1.9–3.7)
GLUCOSE SERPL-MCNC: 104 MG/DL (ref 65–99)
HDLC SERPL-MCNC: 40 MG/DL
LDLC SERPL CALC-MCNC: 93 MG/DL (CALC)
NONHDLC SERPL-MCNC: 121 MG/DL (CALC)
POTASSIUM SERPL-SCNC: 4.8 MMOL/L (ref 3.5–5.3)
PROT SERPL-MCNC: 6.8 G/DL (ref 6.1–8.1)
PSA SERPL-MCNC: 0.5 NG/ML
SL AMB EGFR AFRICAN AMERICAN: 93 ML/MIN/1.73M2
SL AMB EGFR NON AFRICAN AMERICAN: 80 ML/MIN/1.73M2
SODIUM SERPL-SCNC: 143 MMOL/L (ref 135–146)
T3 SERPL-MCNC: 139 NG/DL (ref 76–181)
TRIGL SERPL-MCNC: 183 MG/DL
TSH SERPL-ACNC: 0.57 MIU/L (ref 0.4–4.5)

## 2021-09-20 ENCOUNTER — OFFICE VISIT (OUTPATIENT)
Dept: FAMILY MEDICINE CLINIC | Facility: CLINIC | Age: 51
End: 2021-09-20
Payer: COMMERCIAL

## 2021-09-20 VITALS
DIASTOLIC BLOOD PRESSURE: 100 MMHG | OXYGEN SATURATION: 97 % | WEIGHT: 212.2 LBS | BODY MASS INDEX: 29.71 KG/M2 | HEIGHT: 71 IN | SYSTOLIC BLOOD PRESSURE: 148 MMHG | HEART RATE: 88 BPM | TEMPERATURE: 97.9 F | RESPIRATION RATE: 16 BRPM

## 2021-09-20 DIAGNOSIS — Z00.00 HEALTHCARE MAINTENANCE: Primary | ICD-10-CM

## 2021-09-20 DIAGNOSIS — R73.09 ELEVATED GLUCOSE: ICD-10-CM

## 2021-09-20 DIAGNOSIS — I10 ESSENTIAL HYPERTENSION: ICD-10-CM

## 2021-09-20 DIAGNOSIS — E03.9 ACQUIRED HYPOTHYROIDISM: ICD-10-CM

## 2021-09-20 DIAGNOSIS — F41.9 ANXIETY: ICD-10-CM

## 2021-09-20 PROCEDURE — 99396 PREV VISIT EST AGE 40-64: CPT | Performed by: FAMILY MEDICINE

## 2021-09-20 RX ORDER — VALSARTAN 80 MG/1
80 TABLET ORAL DAILY
Qty: 30 TABLET | Refills: 5 | Status: SHIPPED | OUTPATIENT
Start: 2021-09-20 | End: 2021-10-21 | Stop reason: SDUPTHER

## 2021-09-30 PROBLEM — Z00.00 HEALTHCARE MAINTENANCE: Status: ACTIVE | Noted: 2021-09-30

## 2021-09-30 PROBLEM — I10 ESSENTIAL HYPERTENSION: Status: ACTIVE | Noted: 2021-09-30

## 2021-09-30 NOTE — PROGRESS NOTES
Assessment/Plan:  Yearly health maintenance completed  Patient unfortunately with uncontrolled hypertension  Finally agrees to start blood pressure medication  Start with a valsartan 80 mg daily  If in 2 weeks not at goal near 130/80 or below recommend increase to 160  He will update us in blood pressures were in the next 3-4 weeks  Recommend follow-up in about 5-6 weeks with a BMP at that time with her nurse practitioner  Side effects of medication reviewed  PSA the same as last year to 0 5  No signs of prostate cancer  TSH urine but patient prefers to go to T3 values  Values 139 in range  Feels well  CMP stable  Total cholesterol 161  Triglycerides elevated at 183  Risk factor modification  Recheck on a yearly basis  Recheck blood pressures  Health Maintenance  Pf[employer] vaccine up-to-date  3rd dose discussed  Consider flu shot in the fall  Lifestyle Advice: begin progressive daily aerobic exercise program, follow a low fat, low cholesterol diet and attempt to lose weight  Diet: limited junk food  Exercise: infrequently  Dental: regular dental visits  Vision: no vision problems  Preventative Health: Male Preventative: Prostate cancer screening is up to date  Cholesterol screening up to date    No problem-specific Assessment & Plan notes found for this encounter  Diagnoses and all orders for this visit:    Healthcare maintenance    Essential hypertension  -     valsartan (DIOVAN) 80 mg tablet; Take 1 tablet (80 mg total) by mouth daily  -     Basic metabolic panel; Future    Elevated glucose  -     Hemoglobin A1C; Future    Acquired hypothyroidism    Anxiety                  Subjective:      Patient ID: Kwasi Esquivel is a 46 y o  male  Patient here for yearly physical   Overall feels well  No new complaint          The following portions of the patient's history were reviewed and updated as appropriate: allergies, current medications, past family history, past medical history, past social history, past surgical history and problem list     Review of Systems   Constitutional: Negative for appetite change, fatigue, fever and unexpected weight change  HENT: Negative for congestion, ear pain, postnasal drip, rhinorrhea, sinus pressure, sinus pain and sore throat  Eyes: Negative for redness and visual disturbance  Respiratory: Negative for chest tightness and shortness of breath  Cardiovascular: Negative for chest pain, palpitations and leg swelling  Gastrointestinal: Negative for abdominal distention, abdominal pain, diarrhea and nausea  Endocrine: Negative for cold intolerance and heat intolerance  Genitourinary: Negative for dysuria and hematuria  Musculoskeletal: Negative for arthralgias, gait problem and myalgias  Skin: Negative for pallor and rash  Neurological: Negative for dizziness, tremors, weakness, light-headedness and headaches  Psychiatric/Behavioral: Negative for behavioral problems  The patient is not nervous/anxious  Objective:    /100 (BP Location: Left arm, Patient Position: Sitting, Cuff Size: Adult)   Pulse 88   Temp 97 9 °F (36 6 °C) (Temporal)   Resp 16   Ht 5' 11" (1 803 m)   Wt 96 3 kg (212 lb 3 2 oz)   SpO2 97%   BMI 29 60 kg/m²          Physical Exam  Vitals and nursing note reviewed  Constitutional:       General: He is not in acute distress  Appearance: He is not diaphoretic  HENT:      Head: Normocephalic and atraumatic  Right Ear: Tympanic membrane normal       Left Ear: Tympanic membrane normal    Eyes:      General: No scleral icterus  Conjunctiva/sclera: Conjunctivae normal       Pupils: Pupils are equal, round, and reactive to light  Neck:      Thyroid: No thyromegaly  Cardiovascular:      Rate and Rhythm: Normal rate and regular rhythm  Pulses:           Carotid pulses are 0 on the right side and 0 on the left side  Heart sounds: Normal heart sounds  No murmur heard       Pulmonary: Effort: Pulmonary effort is normal  No respiratory distress  Breath sounds: Normal breath sounds  No wheezing  Abdominal:      General: There is no distension  Palpations: Abdomen is soft  Musculoskeletal:      Cervical back: Normal range of motion and neck supple  Lymphadenopathy:      Cervical: No cervical adenopathy  Skin:     General: Skin is warm  Coloration: Skin is not pale  Neurological:      Mental Status: He is alert and oriented to person, place, and time  Cranial Nerves: No cranial nerve deficit  Deep Tendon Reflexes: Reflexes are normal and symmetric  Psychiatric:         Behavior: Behavior normal          Thought Content: Thought content normal          Judgment: Judgment normal              Patient has no known allergies  Krystin Burns had no medications administered during this visit    Health Maintenance   Topic Date Due    Annual Physical  09/16/2021    BMI: Followup Plan  09/22/2021    Influenza Vaccine (1) 09/01/2021    Hepatitis C Screening  07/07/2025 (Originally 1970)    HIV Screening  08/07/2025 (Originally 3/6/1985)    Depression Screening  09/20/2022    BMI: Adult  09/20/2022    Colorectal Cancer Screening  11/10/2023    DTaP,Tdap,and Td Vaccines (2 - Td or Tdap) 01/01/2027    COVID-19 Vaccine  Completed    Pneumococcal Vaccine: Pediatrics (0 to 5 Years) and At-Risk Patients (6 to 59 Years)  Aged Out    HIB Vaccine  Aged Out    Hepatitis B Vaccine  Aged Out    IPV Vaccine  Aged Out    Hepatitis A Vaccine  Aged Out    Meningococcal ACWY Vaccine  Aged Out    HPV Vaccine  Aged Out      Social History     Socioeconomic History    Marital status: /Civil Union     Spouse name: Not on file    Number of children: Not on file    Years of education: Not on file    Highest education level: Not on file   Occupational History    Not on file   Tobacco Use    Smoking status: Former Smoker    Smokeless tobacco: Current User Vaping Use    Vaping Use: Never used   Substance and Sexual Activity    Alcohol use: No     Comment: Social    Drug use: No    Sexual activity: Not on file   Other Topics Concern    Not on file   Social History Narrative    Not on file     Social Determinants of Health     Financial Resource Strain:     Difficulty of Paying Living Expenses:    Food Insecurity:     Worried About Running Out of Food in the Last Year:     [address] in the Last Year:    Transportation Needs:     Lack of Transportation (Medical):  Lack of Transportation (Non-Medical):    Physical Activity:     Days of Exercise per Week:     Minutes of Exercise per Session:    Stress:     Feeling of Stress :    Social Connections:     Frequency of Communication with Friends and Family:     Frequency of Social Gatherings with Friends and Family:     Attends [employer] Services:     Active Member of Clubs or Organizations:     Attends Club or Organization Meetings:     Marital Status:    Intimate Partner Violence:     Fear of Current or Ex-Partner:     Emotionally Abused:     Physically Abused:     Sexually Abused:       Family History   Problem Relation Age of Onset    No Known Problems Mother     No Known Problems Father     Colon cancer Family       Past Medical History:   Diagnosis Date    Hypothyroidism       has a past surgical history that includes Hemorrhoid surgery (12/2016)     Patient Active Problem List    Diagnosis Date Noted    Abnormal glucose 01/09/2018    Thyroid nodule 05/05/2017    Anxiety 05/26/2016    GERD without esophagitis 05/25/2016    IBS (irritable bowel syndrome) 04/14/2016    Cervical somatic dysfunction 01/15/2016    Radiculopathy 12/21/2015    Right shoulder pain 12/21/2015    Hyperlipemia 10/07/2015    Internal hemorrhoids 10/07/2015    External hemorrhoids with other complication 40/08/4751    Vitamin D deficiency 10/20/2013    Hypothyroidism 09/03/2012       Current Outpatient Medications:     thyroid (NP Thyroid) 60 MG tablet, TAKE 2 TABS DAILY IN AM, Disp: 180 tablet, Rfl: 3    valsartan (DIOVAN) 80 mg tablet, Take 1 tablet (80 mg total) by mouth daily, Disp: 30 tablet, Rfl: 5              BMI Counseling: Body mass index is 29 6 kg/m²  The BMI is above normal  Nutrition recommendations include decreasing portion sizes, encouraging healthy choices of fruits and vegetables, decreasing fast food intake, consuming healthier snacks, limiting drinks that contain sugar, moderation in carbohydrate intake, increasing intake of lean protein, reducing intake of saturated and trans fat and reducing intake of cholesterol  Exercise recommendations include exercising 3-5 times per week  Rationale for BMI follow-up plan is due to patient being overweight or obese  Depression Screening and Follow-up Plan:   Patient was screened for depression during today's encounter  They screened negative with a PHQ-2 score of 0  Tobacco Cessation Counseling: Tobacco cessation counseling was provided  The patient is sincerely urged to quit consumption of tobacco  He is not ready to quit tobacco  Medication options not discussed

## 2021-10-17 LAB
BUN SERPL-MCNC: 12 MG/DL (ref 7–25)
BUN/CREAT SERPL: NORMAL (CALC) (ref 6–22)
CALCIUM SERPL-MCNC: 9.4 MG/DL (ref 8.6–10.3)
CHLORIDE SERPL-SCNC: 105 MMOL/L (ref 98–110)
CO2 SERPL-SCNC: 31 MMOL/L (ref 20–32)
CREAT SERPL-MCNC: 1.11 MG/DL (ref 0.7–1.33)
GLUCOSE SERPL-MCNC: 91 MG/DL (ref 65–99)
HBA1C MFR BLD: 5.5 % OF TOTAL HGB
POTASSIUM SERPL-SCNC: 4.1 MMOL/L (ref 3.5–5.3)
SL AMB EGFR AFRICAN AMERICAN: 89 ML/MIN/1.73M2
SL AMB EGFR NON AFRICAN AMERICAN: 76 ML/MIN/1.73M2
SODIUM SERPL-SCNC: 141 MMOL/L (ref 135–146)

## 2021-10-21 ENCOUNTER — OFFICE VISIT (OUTPATIENT)
Dept: FAMILY MEDICINE CLINIC | Facility: CLINIC | Age: 51
End: 2021-10-21
Payer: COMMERCIAL

## 2021-10-21 VITALS
RESPIRATION RATE: 16 BRPM | HEART RATE: 92 BPM | HEIGHT: 71 IN | SYSTOLIC BLOOD PRESSURE: 136 MMHG | BODY MASS INDEX: 29.46 KG/M2 | DIASTOLIC BLOOD PRESSURE: 87 MMHG | TEMPERATURE: 97.7 F | WEIGHT: 210.4 LBS | OXYGEN SATURATION: 97 %

## 2021-10-21 DIAGNOSIS — Z23 ENCOUNTER FOR IMMUNIZATION: ICD-10-CM

## 2021-10-21 DIAGNOSIS — I10 ESSENTIAL HYPERTENSION: Primary | ICD-10-CM

## 2021-10-21 PROBLEM — R73.09 ELEVATED GLUCOSE: Status: RESOLVED | Noted: 2018-01-09 | Resolved: 2021-10-21

## 2021-10-21 PROCEDURE — 90682 RIV4 VACC RECOMBINANT DNA IM: CPT | Performed by: FAMILY MEDICINE

## 2021-10-21 PROCEDURE — 90471 IMMUNIZATION ADMIN: CPT | Performed by: FAMILY MEDICINE

## 2021-10-21 PROCEDURE — 3008F BODY MASS INDEX DOCD: CPT | Performed by: FAMILY MEDICINE

## 2021-10-21 PROCEDURE — 99213 OFFICE O/P EST LOW 20 MIN: CPT | Performed by: FAMILY MEDICINE

## 2021-10-21 RX ORDER — VALSARTAN 160 MG/1
160 TABLET ORAL DAILY
Qty: 90 TABLET | Refills: 1
Start: 2021-10-21 | End: 2021-11-08 | Stop reason: SDUPTHER

## 2021-11-08 DIAGNOSIS — I10 ESSENTIAL HYPERTENSION: ICD-10-CM

## 2021-11-10 RX ORDER — VALSARTAN 160 MG/1
160 TABLET ORAL DAILY
Qty: 90 TABLET | Refills: 1 | Status: SHIPPED | OUTPATIENT
Start: 2021-11-10 | End: 2022-04-22 | Stop reason: SDUPTHER

## 2021-12-13 DIAGNOSIS — E03.9 ACQUIRED HYPOTHYROIDISM: ICD-10-CM

## 2021-12-13 RX ORDER — LEVOTHYROXINE AND LIOTHYRONINE 38; 9 UG/1; UG/1
TABLET ORAL
Qty: 180 TABLET | Refills: 3 | Status: SHIPPED | OUTPATIENT
Start: 2021-12-13

## 2022-03-17 ENCOUNTER — TELEPHONE (OUTPATIENT)
Dept: FAMILY MEDICINE CLINIC | Facility: CLINIC | Age: 52
End: 2022-03-17

## 2022-03-17 DIAGNOSIS — R05.9 COUGH: Primary | ICD-10-CM

## 2022-03-17 RX ORDER — PROMETHAZINE HYDROCHLORIDE AND CODEINE PHOSPHATE 6.25; 1 MG/5ML; MG/5ML
5 SYRUP ORAL EVERY 12 HOURS PRN
Qty: 118 ML | Refills: 0 | Status: SHIPPED | OUTPATIENT
Start: 2022-03-17 | End: 2022-04-18 | Stop reason: SDUPTHER

## 2022-03-17 NOTE — TELEPHONE ENCOUNTER
Patient called asking for cough medicine with codeine due to his cough and cold symptoms  He started with a S/T, head cold for 2 days and moved down into his chest and now is coughing and over the counter medication is not helping  He stated Dr Renata Rollins had prescribed this previously for him  Patient uses [facility] [address] in [facility]

## 2022-04-12 ENCOUNTER — TELEPHONE (OUTPATIENT)
Dept: FAMILY MEDICINE CLINIC | Facility: CLINIC | Age: 52
End: 2022-04-12

## 2022-04-12 DIAGNOSIS — R05.9 COUGH: Primary | ICD-10-CM

## 2022-04-12 RX ORDER — AZITHROMYCIN 250 MG/1
TABLET, FILM COATED ORAL
Qty: 6 TABLET | Refills: 0 | Status: SHIPPED | OUTPATIENT
Start: 2022-04-12 | End: 2022-04-17

## 2022-04-12 NOTE — TELEPHONE ENCOUNTER
Dr Rodriguez pt did complete the rx for promethazine codeine  Pt still has lingering cough  Pt has no phlegm no mucus no congestion cough is dry  Pt has had this for 2 weeks  No other symptoms  [facility] [place] [address]    No exposure, no pos Covid 19 test   Pt is vaccinated  Pt was asking would you send in rx of steroid or antidotic  Pt offered virtual declined     Pt's number is [phone]

## 2022-04-18 ENCOUNTER — OFFICE VISIT (OUTPATIENT)
Dept: FAMILY MEDICINE CLINIC | Facility: CLINIC | Age: 52
End: 2022-04-18
Payer: COMMERCIAL

## 2022-04-18 VITALS
WEIGHT: 210.8 LBS | BODY MASS INDEX: 29.51 KG/M2 | SYSTOLIC BLOOD PRESSURE: 132 MMHG | HEART RATE: 82 BPM | DIASTOLIC BLOOD PRESSURE: 86 MMHG | TEMPERATURE: 96.8 F | OXYGEN SATURATION: 97 % | HEIGHT: 71 IN | RESPIRATION RATE: 16 BRPM

## 2022-04-18 DIAGNOSIS — R05.9 COUGH: ICD-10-CM

## 2022-04-18 DIAGNOSIS — R06.2 WHEEZING: Primary | ICD-10-CM

## 2022-04-18 PROCEDURE — 3079F DIAST BP 80-89 MM HG: CPT | Performed by: FAMILY MEDICINE

## 2022-04-18 PROCEDURE — 3075F SYST BP GE 130 - 139MM HG: CPT | Performed by: FAMILY MEDICINE

## 2022-04-18 PROCEDURE — 3725F SCREEN DEPRESSION PERFORMED: CPT | Performed by: FAMILY MEDICINE

## 2022-04-18 PROCEDURE — 99213 OFFICE O/P EST LOW 20 MIN: CPT | Performed by: FAMILY MEDICINE

## 2022-04-18 RX ORDER — PROMETHAZINE HYDROCHLORIDE AND CODEINE PHOSPHATE 6.25; 1 MG/5ML; MG/5ML
5 SYRUP ORAL EVERY 12 HOURS PRN
Qty: 118 ML | Refills: 0 | Status: SHIPPED | OUTPATIENT
Start: 2022-04-18

## 2022-04-18 RX ORDER — PREDNISONE 10 MG/1
TABLET ORAL
Qty: 21 TABLET | Refills: 0 | Status: SHIPPED | OUTPATIENT
Start: 2022-04-18

## 2022-04-18 NOTE — PATIENT INSTRUCTIONS
Here for wheeze and cough, Rec trial of prednisone and refill cough med, no driving  Stay well hydrated

## 2022-04-18 NOTE — PROGRESS NOTES
Assessment/Plan:  Chief Complaint   Patient presents with    Wheezing     ongoing dry wheezing and cough  x few weeks covid test neg      Patient Instructions   Here for wheeze and cough, Rec trial of prednisone and refill cough med, no driving  Stay well hydrated  No problem-specific Assessment & Plan notes found for this encounter  Diagnoses and all orders for this visit:    Wheezing    Cough          Subjective:      Patient ID: Sulaiman Land is a 46 y o  male  Wheezing (ongoing dry wheezing and cough  x few weeks covid test neg )      The following portions of the patient's history were reviewed and updated as appropriate: allergies, current medications, past family history, past medical history, past social history, past surgical history and problem list     Review of Systems   Constitutional: Negative  HENT: Negative  Eyes: Negative  Respiratory: Positive for cough and wheezing  Cardiovascular: Negative  Gastrointestinal: Negative  Endocrine: Negative  Genitourinary: Negative  Musculoskeletal: Negative  Skin: Negative  Allergic/Immunologic: Negative  Neurological: Negative  Hematological: Negative  Psychiatric/Behavioral: Negative  Objective:      /86 (BP Location: Left arm, Patient Position: Sitting, Cuff Size: Adult)   Pulse 82   Temp (!) 96 8 °F (36 °C) (Temporal)   Resp 16   Ht 5' 11" (1 803 m)   Wt 95 6 kg (210 lb 12 8 oz)   SpO2 97%   BMI 29 40 kg/m²          Physical Exam  Constitutional:       Appearance: He is well-developed  HENT:      Head: Normocephalic and atraumatic  Right Ear: External ear normal       Left Ear: External ear normal       Nose: Nose normal    Eyes:      Conjunctiva/sclera: Conjunctivae normal       Pupils: Pupils are equal, round, and reactive to light  Cardiovascular:      Rate and Rhythm: Normal rate and regular rhythm  Heart sounds: Normal heart sounds     Pulmonary:      Effort: Pulmonary effort is normal       Breath sounds: Normal breath sounds  Musculoskeletal:         General: Normal range of motion  Cervical back: Normal range of motion and neck supple  Skin:     General: Skin is warm and dry  Neurological:      Mental Status: He is alert and oriented to person, place, and time  Deep Tendon Reflexes: Reflexes are normal and symmetric     Psychiatric:         Behavior: Behavior normal

## 2022-04-18 NOTE — PROGRESS NOTES
BMI Counseling: Body mass index is 29 4 kg/m²  The BMI is above normal  Nutrition recommendations include reducing portion sizes, decreasing overall calorie intake, 3-5 servings of fruits/vegetables daily, reducing fast food intake and consuming healthier snacks  Exercise recommendations include exercising 3-5 times per week

## 2022-04-22 ENCOUNTER — OFFICE VISIT (OUTPATIENT)
Dept: FAMILY MEDICINE CLINIC | Facility: CLINIC | Age: 52
End: 2022-04-22
Payer: COMMERCIAL

## 2022-04-22 DIAGNOSIS — I10 ESSENTIAL HYPERTENSION: Primary | ICD-10-CM

## 2022-04-22 DIAGNOSIS — Z00.00 HEALTHCARE MAINTENANCE: ICD-10-CM

## 2022-04-22 DIAGNOSIS — E03.9 ACQUIRED HYPOTHYROIDISM: ICD-10-CM

## 2022-04-22 DIAGNOSIS — Z12.5 PROSTATE CANCER SCREENING: ICD-10-CM

## 2022-04-22 PROCEDURE — 1036F TOBACCO NON-USER: CPT | Performed by: FAMILY MEDICINE

## 2022-04-22 PROCEDURE — 99213 OFFICE O/P EST LOW 20 MIN: CPT | Performed by: FAMILY MEDICINE

## 2022-04-22 PROCEDURE — 3008F BODY MASS INDEX DOCD: CPT | Performed by: FAMILY MEDICINE

## 2022-04-22 RX ORDER — VALSARTAN 160 MG/1
160 TABLET ORAL DAILY
Qty: 90 TABLET | Refills: 1 | Status: SHIPPED | OUTPATIENT
Start: 2022-04-22

## 2022-04-23 VITALS
TEMPERATURE: 97.6 F | HEART RATE: 88 BPM | DIASTOLIC BLOOD PRESSURE: 86 MMHG | HEIGHT: 71 IN | RESPIRATION RATE: 16 BRPM | BODY MASS INDEX: 29.88 KG/M2 | SYSTOLIC BLOOD PRESSURE: 140 MMHG | OXYGEN SATURATION: 95 % | WEIGHT: 213.4 LBS

## 2022-04-23 NOTE — PROGRESS NOTES
Assessment/Plan:  Blood pressure stable  Home blood pressures much better  Explain that prednisone may raise blood pressure which he is on right now  Recheck 6 months with labs at that time including full thyroid labs  CT coronary calcium score ordered for routine surveillance of heart disease  Diagnoses and all orders for this visit:    Essential hypertension  -     valsartan (DIOVAN) 160 mg tablet; Take 1 tablet (160 mg total) by mouth daily  -     CT coronary calcium score; Future    Acquired hypothyroidism  -     TSH, 3rd generation; Future  -     T3; Future  -     T3, free; Future    Healthcare maintenance  -     Comprehensive metabolic panel; Future  -     Lipid panel; Future  -     TSH, 3rd generation; Future  -     T3; Future  -     T3, free; Future  -     PSA, Total Screen; Future    Prostate cancer screening  -     PSA, Total Screen; Future        1  Essential hypertension  valsartan (DIOVAN) 160 mg tablet    CT coronary calcium score   2  Acquired hypothyroidism  TSH, 3rd generation    T3    T3, free   3  Healthcare maintenance  Comprehensive metabolic panel    Lipid panel    TSH, 3rd generation    T3    T3, free    PSA, Total Screen   4  Prostate cancer screening  PSA, Total Screen       Subjective:        Patient ID: Chava Salazar is a 46 y o  male    Chief Complaint   Patient presents with    Follow-up     6 month blood pressure check        HPI    The following portions of the patient's history were reviewed and updated as appropriate: past medical history, past surgical history and problem list       Review of Systems      Objective:  /86   Pulse 88   Temp 97 6 °F (36 4 °C) (Temporal)   Resp 16   Ht 5' 11" (1 803 m)   Wt 96 8 kg (213 lb 6 4 oz)   SpO2 95%   BMI 29 76 kg/m²        Physical Exam

## 2022-10-12 PROBLEM — Z00.00 HEALTHCARE MAINTENANCE: Status: RESOLVED | Noted: 2021-09-30 | Resolved: 2022-10-12

## 2022-10-20 DIAGNOSIS — I10 ESSENTIAL HYPERTENSION: ICD-10-CM

## 2022-10-20 RX ORDER — VALSARTAN 160 MG/1
TABLET ORAL
Qty: 90 TABLET | Refills: 1 | Status: SHIPPED | OUTPATIENT
Start: 2022-10-20

## 2022-11-01 LAB
ALBUMIN SERPL-MCNC: 4.3 G/DL (ref 3.6–5.1)
ALBUMIN/GLOB SERPL: 1.8 (CALC) (ref 1–2.5)
ALP SERPL-CCNC: 57 U/L (ref 35–144)
ALT SERPL-CCNC: 18 U/L (ref 9–46)
AST SERPL-CCNC: 15 U/L (ref 10–35)
BILIRUB SERPL-MCNC: 0.3 MG/DL (ref 0.2–1.2)
BUN SERPL-MCNC: 12 MG/DL (ref 7–25)
BUN/CREAT SERPL: NORMAL (CALC) (ref 6–22)
CALCIUM SERPL-MCNC: 9.1 MG/DL (ref 8.6–10.3)
CHLORIDE SERPL-SCNC: 104 MMOL/L (ref 98–110)
CHOLEST SERPL-MCNC: 153 MG/DL
CHOLEST/HDLC SERPL: 3.8 (CALC)
CO2 SERPL-SCNC: 29 MMOL/L (ref 20–32)
CREAT SERPL-MCNC: 1.07 MG/DL (ref 0.7–1.3)
GFR/BSA.PRED SERPLBLD CYS-BASED-ARV: 83 ML/MIN/1.73M2
GLOBULIN SER CALC-MCNC: 2.4 G/DL (CALC) (ref 1.9–3.7)
GLUCOSE SERPL-MCNC: 85 MG/DL (ref 65–99)
HDLC SERPL-MCNC: 40 MG/DL
LDLC SERPL CALC-MCNC: 79 MG/DL (CALC)
NONHDLC SERPL-MCNC: 113 MG/DL (CALC)
POTASSIUM SERPL-SCNC: 3.9 MMOL/L (ref 3.5–5.3)
PROT SERPL-MCNC: 6.7 G/DL (ref 6.1–8.1)
PSA SERPL-MCNC: 0.59 NG/ML
SODIUM SERPL-SCNC: 140 MMOL/L (ref 135–146)
T3 SERPL-MCNC: 111 NG/DL (ref 76–181)
T3FREE SERPL-MCNC: 3.9 PG/ML (ref 2.3–4.2)
TRIGL SERPL-MCNC: 242 MG/DL
TSH SERPL-ACNC: 1.85 MIU/L (ref 0.4–4.5)

## 2022-11-15 ENCOUNTER — RA CDI HCC (OUTPATIENT)
Dept: OTHER | Facility: HOSPITAL | Age: 52
End: 2022-11-15

## 2022-11-18 ENCOUNTER — OFFICE VISIT (OUTPATIENT)
Dept: FAMILY MEDICINE CLINIC | Facility: CLINIC | Age: 52
End: 2022-11-18

## 2022-11-18 VITALS
BODY MASS INDEX: 29.79 KG/M2 | TEMPERATURE: 97.5 F | SYSTOLIC BLOOD PRESSURE: 113 MMHG | WEIGHT: 212.8 LBS | HEART RATE: 101 BPM | OXYGEN SATURATION: 98 % | RESPIRATION RATE: 16 BRPM | HEIGHT: 71 IN | DIASTOLIC BLOOD PRESSURE: 82 MMHG

## 2022-11-18 DIAGNOSIS — K62.89 RECTAL LUMP: Primary | ICD-10-CM

## 2022-11-18 DIAGNOSIS — E03.9 ACQUIRED HYPOTHYROIDISM: ICD-10-CM

## 2022-11-18 DIAGNOSIS — I10 ESSENTIAL HYPERTENSION: ICD-10-CM

## 2022-11-18 NOTE — PROGRESS NOTES
Assessment/Plan:  Health maintenance completed  Patient overall stable left perirectal area  Nontender  Flu shot COVID vaccine discussed  Continue same current thyroid medication and blood pressure medication  Recheck 6 months  No labs needed at that time  PSA in good shape  Cologuard completed  Would like to review with him next time previous CT scan done at emergency room  Do not think he was interested in getting any follow-up testing but will review it again when he comes in next visit  Health Maintenance  Lifestyle Advice: quit smoking, begin progressive daily aerobic exercise program, follow a low fat, low cholesterol diet and attempt to lose weight  Diet: limited junk food  Exercise: infrequently  Dental: regular dental visits  Vision: no vision problems  Preventative Health: Male Preventative: Prostate cancer screening is up to date  Colon cancer screening is up to date    No problem-specific Assessment & Plan notes found for this encounter  Diagnoses and all orders for this visit:    Rectal lump  -     Ambulatory Referral to Colorectal Surgery; Future    Essential hypertension    Acquired hypothyroidism                  Subjective:      Patient ID: Crystal Tidwell is a 46 y o  male  Healthcare maintenance  Feels well  Would like to have surgical consult regarding a lump in perirectal area  The following portions of the patient's history were reviewed and updated as appropriate: allergies, current medications, past family history, past medical history, past social history, past surgical history and problem list     Review of Systems   Constitutional: Negative for appetite change, fatigue, fever and unexpected weight change  HENT: Negative for congestion, ear pain, postnasal drip, rhinorrhea, sinus pressure, sinus pain and sore throat  Eyes: Negative for redness and visual disturbance  Respiratory: Negative for chest tightness and shortness of breath  Cardiovascular: Negative for chest pain, palpitations and leg swelling  Gastrointestinal: Negative for abdominal distention, abdominal pain, diarrhea and nausea  Endocrine: Negative for cold intolerance and heat intolerance  Genitourinary: Negative for dysuria and hematuria  Musculoskeletal: Negative for arthralgias, gait problem and myalgias  Skin: Negative for pallor and rash  Neurological: Negative for dizziness, tremors, weakness, light-headedness and headaches  Psychiatric/Behavioral: Negative for behavioral problems  The patient is not nervous/anxious  Objective:    /82   Pulse 101   Temp 97 5 °F (36 4 °C) (Temporal)   Resp 16   Ht 5' 11" (1 803 m)   Wt 96 5 kg (212 lb 12 8 oz)   SpO2 98%   BMI 29 68 kg/m²          Physical Exam  Vitals and nursing note reviewed  Constitutional:       General: He is not in acute distress  Appearance: He is not diaphoretic  HENT:      Head: Normocephalic and atraumatic  Eyes:      General: No scleral icterus  Conjunctiva/sclera: Conjunctivae normal       Pupils: Pupils are equal, round, and reactive to light  Neck:      Thyroid: No thyromegaly  Cardiovascular:      Rate and Rhythm: Normal rate and regular rhythm  Pulses:           Carotid pulses are 0 on the right side and 0 on the left side  Heart sounds: Normal heart sounds  No murmur heard  Pulmonary:      Effort: Pulmonary effort is normal  No respiratory distress  Breath sounds: Normal breath sounds  No wheezing  Abdominal:      General: There is no distension  Palpations: Abdomen is soft  Genitourinary:     Comments: Palpable lump movable in left perirectal area  Nontender  Musculoskeletal:      Cervical back: Normal range of motion and neck supple  Right lower leg: No edema  Left lower leg: No edema  Lymphadenopathy:      Cervical: No cervical adenopathy  Skin:     General: Skin is warm  Coloration: Skin is not pale  Neurological:      General: No focal deficit present  Mental Status: He is alert and oriented to person, place, and time  Cranial Nerves: No cranial nerve deficit  Deep Tendon Reflexes: Reflexes are normal and symmetric  Psychiatric:         Mood and Affect: Mood normal          Behavior: Behavior normal          Thought Content: Thought content normal          Judgment: Judgment normal              Patient has no known allergies  Charmayne Speaks had no medications administered during this visit    Health Maintenance   Topic Date Due   • COVID-19 Vaccine (4 - Booster for Pfizer series) 03/22/2022   • Influenza Vaccine (1) 09/01/2022   • Depression Screening  04/18/2023   • BMI: Followup Plan  04/18/2023   • Hepatitis B Vaccine (1 of 3 - 3-dose series) 11/18/2023 (Originally 1970)   • Hepatitis C Screening  07/07/2025 (Originally 1970)   • HIV Screening  08/07/2025 (Originally 3/6/1985)   • Colorectal Cancer Screening  11/10/2023   • BMI: Adult  11/18/2023   • Annual Physical  11/18/2023   • DTaP,Tdap,and Td Vaccines (2 - Td or Tdap) 01/01/2027   • Pneumococcal Vaccine: Pediatrics (0 to 5 Years) and At-Risk Patients (6 to 59 Years)  Aged Out   • HIB Vaccine  Aged Out   • IPV Vaccine  Aged Out   • Hepatitis A Vaccine  Aged Out   • Meningococcal ACWY Vaccine  Aged Out   • HPV Vaccine  Aged Out      Social History     Socioeconomic History   • Marital status: /Civil Union     Spouse name: Not on file   • Number of children: Not on file   • Years of education: Not on file   • Highest education level: Not on file   Occupational History   • Not on file   Tobacco Use   • Smoking status: Former   • Smokeless tobacco: Current   Vaping Use   • Vaping Use: Never used   Substance and Sexual Activity   • Alcohol use: No     Comment: Social   • Drug use: No   • Sexual activity: Not on file   Other Topics Concern   • Not on file   Social History Narrative   • Not on file     Social Determinants of Health     Financial Resource Strain: Not on file   Food Insecurity: Not on file   Transportation Needs: Not on file   Physical Activity: Not on file   Stress: Not on file   Social Connections: Not on file   Intimate Partner Violence: Not on file   Housing Stability: Not on file      Family History   Problem Relation Age of Onset   • No Known Problems Mother    • No Known Problems Father    • Colon cancer Family       Past Medical History:   Diagnosis Date   • Hypothyroidism       has a past surgical history that includes Hemorrhoid surgery (12/2016)  Patient Active Problem List    Diagnosis Date Noted   • Essential hypertension 09/30/2021   • Thyroid nodule 05/05/2017   • Anxiety 05/26/2016   • GERD without esophagitis 05/25/2016   • IBS (irritable bowel syndrome) 04/14/2016   • Cervical somatic dysfunction 01/15/2016   • Radiculopathy 12/21/2015   • Right shoulder pain 12/21/2015   • Hyperlipemia 10/07/2015   • Internal hemorrhoids 10/07/2015   • External hemorrhoids with other complication 03/91/7456   • Vitamin D deficiency 10/20/2013   • Hypothyroidism 09/03/2012       Current Outpatient Medications:   •  thyroid (NP Thyroid) 60 MG tablet, TAKE 2 TABS DAILY IN AM, Disp: 180 tablet, Rfl: 3  •  valsartan (DIOVAN) 160 mg tablet, TAKE 1 TABLET BY MOUTH EVERY DAY, Disp: 90 tablet, Rfl: 1                Tobacco Cessation Counseling: Tobacco cessation counseling was provided  The patient is sincerely urged to quit consumption of tobacco  He is not ready to quit tobacco  Medication options not discussed

## 2022-12-30 PROBLEM — R22.2 NODULE OF BUTTOCK: Status: ACTIVE | Noted: 2022-12-30

## 2023-01-12 ENCOUNTER — TELEPHONE (OUTPATIENT)
Age: 53
End: 2023-01-12

## 2023-01-12 ENCOUNTER — PREP FOR PROCEDURE (OUTPATIENT)
Age: 53
End: 2023-01-12

## 2023-01-12 DIAGNOSIS — K62.9 PERIANAL LESION: Primary | ICD-10-CM

## 2023-01-12 NOTE — LETTER
[facility] [id number]        ------------------------------------------------------------------------------------------------------------  1/12/2023    Dear Atul Guerrier,    Your surgery is scheduled for: 2/22/2023  The hospital will call the evening prior to your surgery with your expected arrival time  Location:[address] , [facility] [address]    CHECK LIST PRIOR TO OUTPATIENT SURGERY  It is your responsibility to obtain any/all referrals needed for your surgery if required by your insurance  Our office will contact you to discuss your insurance coverage for this procedure  Special instructions required if you are taking any blood thinners  Please verify with the prescribing physician  Examples include Coumadin, Plavix, Xarelto, Eliquis, Pradaxa, etc      Please check with your family physician if you are taking the following medications: Aspirin or any Aspirin containing medication, Gingko biloba, Ginseng, Feverfew, and/or St  Henry’s Wort  We suggest stopping these for 3 days  The night before and the day of your surgery, wash from your neck to groin with chlorhexidine soap  This soap is available at most retail pharmacies under such brand names as Hibiclens, Endure or Aplicare  Pre-admission testing Required: YES      NO x       Other Clearances/ Additional Testing: NONE     NOTHING TO EAT OR DRINK AFTER MIDNIGHT PRIOR TO SURGERY  Take ONE FLEET ENEMA one hour prior to leaving for the hospital      Please do not hesitate to call our office with any questions regarding your surgery  POSTOPERATIVE INSTRUCTIONS FOR ANAL & RECTAL SURGERY      Please call our office to schedule your 6 week post operative visit  Your recovery following surgery will continue at home  This post-op instruction sheet is designed to help in that process      CARE OF YOUR INCISION:  Wash anal area after bowel movements with plain warm water  Use a sitz bath or sit in a tub of warm water several times a day  Sutures need not be removed  They will dissolve  BASIC INSTRUCTIONS:  Maintain a regular diet  Drink plenty of fluids  Take Metamucil ONCE daily  Call the physician if you have a fever, vomiting, or chills  Bowel movements should occur within 48 hours after surgery  If not, 2 Tablespoons of MINERAL OIL or MILK OF MAGNESIA can be taken up to 3 times a day until the first bowel movement  Avoid straining with bowel movements  Please do not hesitate to call our office to speak to one of our nurses or doctors if you have any questions regarding your surgery or recovery  Randy Grace

## 2023-01-12 NOTE — TELEPHONE ENCOUNTER
Patient scheduled for surgery 2/22/2023 at [facility] AND [facility] [place]   Instructions and PAT's gone over with and mailed to the patient

## 2023-02-14 NOTE — PRE-PROCEDURE INSTRUCTIONS
Pre-Surgery Instructions:   Medication Instructions   • METAMUCIL FIBER PO Hold day of surgery  • thyroid (NP Thyroid) 60 MG tablet Take day of surgery  • valsartan (DIOVAN) 160 mg tablet Hold day of surgery  Covid screening negative as per patient  Reviewed pre op medicine and showering instructions with patient via phone call, verbalizes understanding  Advised patient to stop taking non prescribed vitamins, herbal meds and NSAID's 7 days pre op  Advised may take Tylenol products if needed  Advised to take DOS medicine with a small sip water  Advised NPO after MN prior to surgery and surgical services will call (2/21) with scheduled time of hospital arrival     Advised to notify surgeon's office of any change in health status from now until surgery  Pt verbalized understanding of all instructions

## 2023-02-16 DIAGNOSIS — E03.9 ACQUIRED HYPOTHYROIDISM: ICD-10-CM

## 2023-02-16 RX ORDER — LEVOTHYROXINE, LIOTHYRONINE 38; 9 UG/1; UG/1
TABLET ORAL
Qty: 180 TABLET | Refills: 3 | Status: SHIPPED | OUTPATIENT
Start: 2023-02-16

## 2023-02-22 ENCOUNTER — ANESTHESIA EVENT (OUTPATIENT)
Dept: PERIOP | Facility: HOSPITAL | Age: 53
End: 2023-02-22

## 2023-02-22 ENCOUNTER — HOSPITAL ENCOUNTER (OUTPATIENT)
Facility: HOSPITAL | Age: 53
Setting detail: OUTPATIENT SURGERY
Discharge: HOME | End: 2023-02-22
Attending: COLON & RECTAL SURGERY | Admitting: COLON & RECTAL SURGERY

## 2023-02-22 ENCOUNTER — ANESTHESIA (OUTPATIENT)
Dept: PERIOP | Facility: HOSPITAL | Age: 53
End: 2023-02-22

## 2023-02-22 VITALS
HEIGHT: 71 IN | TEMPERATURE: 97.3 F | RESPIRATION RATE: 18 BRPM | DIASTOLIC BLOOD PRESSURE: 73 MMHG | OXYGEN SATURATION: 97 % | WEIGHT: 207 LBS | SYSTOLIC BLOOD PRESSURE: 117 MMHG | BODY MASS INDEX: 28.98 KG/M2 | HEART RATE: 97 BPM

## 2023-02-22 DIAGNOSIS — K62.9 PERIANAL LESION: ICD-10-CM

## 2023-02-22 DIAGNOSIS — R22.2 NODULE OF BUTTOCK: Primary | ICD-10-CM

## 2023-02-22 RX ORDER — SODIUM CHLORIDE, SODIUM LACTATE, POTASSIUM CHLORIDE, CALCIUM CHLORIDE 600; 310; 30; 20 MG/100ML; MG/100ML; MG/100ML; MG/100ML
INJECTION, SOLUTION INTRAVENOUS CONTINUOUS PRN
Status: DISCONTINUED | OUTPATIENT
Start: 2023-02-22 | End: 2023-02-22

## 2023-02-22 RX ORDER — ROCURONIUM BROMIDE 10 MG/ML
INJECTION, SOLUTION INTRAVENOUS AS NEEDED
Status: DISCONTINUED | OUTPATIENT
Start: 2023-02-22 | End: 2023-02-22

## 2023-02-22 RX ORDER — SODIUM CHLORIDE, SODIUM LACTATE, POTASSIUM CHLORIDE, CALCIUM CHLORIDE 600; 310; 30; 20 MG/100ML; MG/100ML; MG/100ML; MG/100ML
125 INJECTION, SOLUTION INTRAVENOUS CONTINUOUS
Status: DISCONTINUED | OUTPATIENT
Start: 2023-02-22 | End: 2023-02-22 | Stop reason: HOSPADM

## 2023-02-22 RX ORDER — OXYCODONE HYDROCHLORIDE 5 MG/1
5 TABLET ORAL EVERY 4 HOURS PRN
Status: DISCONTINUED | OUTPATIENT
Start: 2023-02-22 | End: 2023-02-22 | Stop reason: HOSPADM

## 2023-02-22 RX ORDER — FENTANYL CITRATE 50 UG/ML
INJECTION, SOLUTION INTRAMUSCULAR; INTRAVENOUS AS NEEDED
Status: DISCONTINUED | OUTPATIENT
Start: 2023-02-22 | End: 2023-02-22

## 2023-02-22 RX ORDER — OXYCODONE HYDROCHLORIDE 5 MG/1
5 TABLET ORAL EVERY 4 HOURS PRN
Qty: 15 TABLET | Refills: 0 | Status: SHIPPED | OUTPATIENT
Start: 2023-02-22 | End: 2023-03-04

## 2023-02-22 RX ORDER — ONDANSETRON 2 MG/ML
4 INJECTION INTRAMUSCULAR; INTRAVENOUS ONCE AS NEEDED
Status: DISCONTINUED | OUTPATIENT
Start: 2023-02-22 | End: 2023-02-22 | Stop reason: HOSPADM

## 2023-02-22 RX ORDER — LIDOCAINE HYDROCHLORIDE 10 MG/ML
INJECTION, SOLUTION EPIDURAL; INFILTRATION; INTRACAUDAL; PERINEURAL AS NEEDED
Status: DISCONTINUED | OUTPATIENT
Start: 2023-02-22 | End: 2023-02-22

## 2023-02-22 RX ORDER — HYDROMORPHONE HCL/PF 1 MG/ML
0.5 SYRINGE (ML) INJECTION
Status: DISCONTINUED | OUTPATIENT
Start: 2023-02-22 | End: 2023-02-22 | Stop reason: HOSPADM

## 2023-02-22 RX ORDER — MIDAZOLAM HYDROCHLORIDE 2 MG/2ML
INJECTION, SOLUTION INTRAMUSCULAR; INTRAVENOUS AS NEEDED
Status: DISCONTINUED | OUTPATIENT
Start: 2023-02-22 | End: 2023-02-22

## 2023-02-22 RX ORDER — CEFAZOLIN SODIUM 1 G/3ML
INJECTION, POWDER, FOR SOLUTION INTRAMUSCULAR; INTRAVENOUS AS NEEDED
Status: DISCONTINUED | OUTPATIENT
Start: 2023-02-22 | End: 2023-02-22

## 2023-02-22 RX ORDER — SODIUM CHLORIDE, SODIUM LACTATE, POTASSIUM CHLORIDE, CALCIUM CHLORIDE 600; 310; 30; 20 MG/100ML; MG/100ML; MG/100ML; MG/100ML
100 INJECTION, SOLUTION INTRAVENOUS CONTINUOUS
Status: DISCONTINUED | OUTPATIENT
Start: 2023-02-22 | End: 2023-02-22 | Stop reason: HOSPADM

## 2023-02-22 RX ORDER — NOREPINEPHRINE BITARTRATE 1 MG/ML
INJECTION, SOLUTION INTRAVENOUS AS NEEDED
Status: DISCONTINUED | OUTPATIENT
Start: 2023-02-22 | End: 2023-02-22

## 2023-02-22 RX ORDER — FENTANYL CITRATE/PF 50 MCG/ML
25 SYRINGE (ML) INJECTION
Status: DISCONTINUED | OUTPATIENT
Start: 2023-02-22 | End: 2023-02-22 | Stop reason: HOSPADM

## 2023-02-22 RX ORDER — PROMETHAZINE HYDROCHLORIDE 25 MG/ML
6.25 INJECTION, SOLUTION INTRAMUSCULAR; INTRAVENOUS
Status: DISCONTINUED | OUTPATIENT
Start: 2023-02-22 | End: 2023-02-22 | Stop reason: HOSPADM

## 2023-02-22 RX ORDER — DEXAMETHASONE SODIUM PHOSPHATE 10 MG/ML
INJECTION, SOLUTION INTRAMUSCULAR; INTRAVENOUS AS NEEDED
Status: DISCONTINUED | OUTPATIENT
Start: 2023-02-22 | End: 2023-02-22

## 2023-02-22 RX ORDER — ONDANSETRON 2 MG/ML
INJECTION INTRAMUSCULAR; INTRAVENOUS AS NEEDED
Status: DISCONTINUED | OUTPATIENT
Start: 2023-02-22 | End: 2023-02-22

## 2023-02-22 RX ORDER — PROPOFOL 10 MG/ML
INJECTION, EMULSION INTRAVENOUS AS NEEDED
Status: DISCONTINUED | OUTPATIENT
Start: 2023-02-22 | End: 2023-02-22

## 2023-02-22 RX ORDER — BUPIVACAINE HYDROCHLORIDE AND EPINEPHRINE 2.5; 5 MG/ML; UG/ML
INJECTION, SOLUTION EPIDURAL; INFILTRATION; INTRACAUDAL; PERINEURAL AS NEEDED
Status: DISCONTINUED | OUTPATIENT
Start: 2023-02-22 | End: 2023-02-22 | Stop reason: HOSPADM

## 2023-02-22 RX ADMIN — ROCURONIUM BROMIDE 50 MG: 10 INJECTION INTRAVENOUS at 13:39

## 2023-02-22 RX ADMIN — SODIUM CHLORIDE, SODIUM LACTATE, POTASSIUM CHLORIDE, AND CALCIUM CHLORIDE 125 ML/HR: .6; .31; .03; .02 INJECTION, SOLUTION INTRAVENOUS at 11:10

## 2023-02-22 RX ADMIN — SODIUM CHLORIDE, SODIUM LACTATE, POTASSIUM CHLORIDE, AND CALCIUM CHLORIDE: .6; .31; .03; .02 INJECTION, SOLUTION INTRAVENOUS at 13:26

## 2023-02-22 RX ADMIN — DEXAMETHASONE SODIUM PHOSPHATE 10 MG: 10 INJECTION INTRAMUSCULAR; INTRAVENOUS at 13:44

## 2023-02-22 RX ADMIN — ONDANSETRON 4 MG: 2 INJECTION INTRAMUSCULAR; INTRAVENOUS at 14:00

## 2023-02-22 RX ADMIN — FENTANYL CITRATE 50 MCG: 50 INJECTION INTRAMUSCULAR; INTRAVENOUS at 13:39

## 2023-02-22 RX ADMIN — CEFAZOLIN 2000 MG: 1 INJECTION, POWDER, FOR SOLUTION INTRAMUSCULAR; INTRAVENOUS at 13:50

## 2023-02-22 RX ADMIN — SUGAMMADEX 200 MG: 100 INJECTION, SOLUTION INTRAVENOUS at 14:12

## 2023-02-22 RX ADMIN — LIDOCAINE HYDROCHLORIDE 50 MG: 10 INJECTION, SOLUTION EPIDURAL; INFILTRATION; INTRACAUDAL; PERINEURAL at 13:39

## 2023-02-22 RX ADMIN — NOREPINEPHRINE BITARTRATE 16 MCG: 1 SOLUTION INTRAVENOUS at 13:46

## 2023-02-22 RX ADMIN — FENTANYL CITRATE 50 MCG: 50 INJECTION INTRAMUSCULAR; INTRAVENOUS at 13:55

## 2023-02-22 RX ADMIN — MIDAZOLAM 2 MG: 1 INJECTION INTRAMUSCULAR; INTRAVENOUS at 13:25

## 2023-02-22 RX ADMIN — PROPOFOL 180 MG: 10 INJECTION, EMULSION INTRAVENOUS at 13:39

## 2023-02-22 NOTE — ANESTHESIA PREPROCEDURE EVALUATION
Procedure:  EXAM UNDER ANESTHESIA (EUA) (Anus)  EXCISION  BIOPSY LESION/ MASS  ANAL/RECTAL (Anus)    Relevant Problems   CARDIO   (+) Essential hypertension   (+) Hyperlipemia   (+) Internal hemorrhoids      ENDO   (+) Hypothyroidism      GI/HEPATIC   (+) GERD without esophagitis      NEURO/PSYCH   (+) Anxiety      Digestive   (+) IBS (irritable bowel syndrome)        Physical Exam    Airway    Mallampati score: III  TM Distance: >3 FB  Neck ROM: full     Dental       Cardiovascular      Pulmonary      Other Findings        Anesthesia Plan  ASA Score- 2     Anesthesia Type- general with ASA Monitors  Additional Monitors:   Airway Plan:     Comment: LMA for stirrups  ETT if planned for complete prone  Plan Factors-    Chart reviewed  Existing labs reviewed  Patient summary reviewed  Patient is not a current smoker (somkeless tobacco)  Induction- intravenous  Postoperative Plan- Plan for postoperative opioid use  Planned trial extubation    Informed Consent- Anesthetic plan and risks discussed with patient  VITALS  There were no vitals taken for this visit  BP Readings from Last 3 Encounters:   11/18/22 113/82   04/23/22 140/86   04/18/22 132/86     LABS  Results from Last 12 Months   Lab Units 10/31/22  1420   SODIUM mmol/L 140   POTASSIUM mmol/L 3 9   CHLORIDE mmol/L 104   CO2 mmol/L 29   BUN mg/dL 12   CREATININE mg/dL 1 07   CALCIUM mg/dL 9 1   GLUCOSE RANDOM mg/dL 85   AST U/L 15   ALT U/L 18   ALK PHOS U/L 57   TOTAL BILIRUBIN mg/dL 0 3   ALBUMIN g/dL 4 3     No results for input(s): WBC, HGB, HCT, PLT in the last 8784 hours  No results for input(s): APTT, INR, PTT in the last 8784 hours  ANESTHESIA RISK-BENEFIT DISCUSSION  • BENEFITS INCLUDE (Lynn Juarez 81 [id number], PMID [id number]): (1) The anesthesia team reduces mortality for major surgeries, (2) The team provides as much sedation/amnesia as is reasonably possible, and (3) The team strives to reduce pain and discomfort      • RISKS INCLUDE THE FOLLOWING (PMID [id number]):  Neurologic Risks: IntraOp awareness (Risk is ~1:1,000 - 1:14,000; PMID [id number]), Stroke (Risk ~<0 1-2% for most cases; PMID 99967453), and POCD  Airway and Pulmonary Risks: Dental or mouth injury, throat pain, critical hypoxia, pneumothorax, prolonged intubation, post-op respiratory compromise  • Airway/Intubation factors: No prior advanced airway notes in [facility]  • Risk of pulmonary complications based on a simplified ARISCAT score (Major RFs: none): Score 0-2= Low, 1 6%  Cardiovascular Risks: Gm-op cardiac injury (MACE)  If specialized vascular access is needed, risk of bleeding, infection, or injury to adjacent structures  If a bypass circuit is needed, risk of stroke, blood clot, vasoplegia  • EKG: n/a  No results found for this or any previous visit (from the past 4464 hour(s))  No results found for this or any previous visit  • Echo or other cardiac testing: n/a  • Signs of severe cardiac instability: none  • Dudley's RCRI score and estimate risk of MACE (Major RFs: none): A score of 0= 0 4%  • Are gm-op or intra-op beta blockers indicated?: no   FEN/GI Risks: Aspiration (Approximately 0 5% risk per the IRIS trial) and PONV (10-80% depending on Apfel criteria)  • Patient meets ASA NPO guidelines: no   Adverse drug reaction risks: Allergic reaction, overdose,risk of injury or accident if using machinery or performing physically or mentally hazardous tasks for 24 hrs after anesthesia    • Recent notable medications (including AC medications): yes  • Personal or family history of anesthesia complications: no  • Pregnancy Status: Not applicable   Mortality risks associated with anesthesia based on ASA-PS (PMID 08558785)  - ASA-PS II: Estimated risk  1:20,000

## 2023-02-22 NOTE — H&P
History and Physical   Colon and Rectal Surgery   Nikunj Heath 46 y o  male MRN: [id number]  Unit/Bed#: OR [place] Encounter: [id number]  02/22/23   @NOW    No chief complaint on file          History of Present Illness   HPI:  Sanam Sierra is a 46 y o  male who presents for treatment of symptomatic buttock lesion      Historical Information   Past Medical History:   Diagnosis Date   • Hypertension    • Hypothyroidism      Past Surgical History:   Procedure Laterality Date   • HEMORRHOID SURGERY  12/2016    [place]       Meds/Allergies     Medications Prior to Admission   Medication   • METAMUCIL FIBER PO   • NP Thyroid 60 MG tablet   • valsartan (DIOVAN) 160 mg tablet         Current Facility-Administered Medications:   •  lactated ringers infusion, 125 mL/hr, Intravenous, Continuous, Jude Cosme MD PhD, Last Rate: 125 mL/hr at 02/22/23 1110, 125 mL/hr at 02/22/23 1110    No Known Allergies      Social History   Social History     Substance and Sexual Activity   Alcohol Use Yes   • Alcohol/week: 2 0 standard drinks   • Types: 2 Standard drinks or equivalent per week    Comment: Social     Social History     Substance and Sexual Activity   Drug Use No     Social History     Tobacco Use   Smoking Status Former   Smokeless Tobacco Current   • Types: Chew         Family History:   Family History   Problem Relation Age of Onset   • No Known Problems Mother    • No Known Problems Father    • Colon cancer Family          Objective     Current Vitals:   Blood Pressure: (!) 142/115 (02/22/23 1056)  Pulse: 94 (02/22/23 1056)  Temperature: 97 6 °F (36 4 °C) (02/22/23 1056)  Temp Source: Temporal (02/22/23 1056)  Respirations: 18 (02/22/23 1056)  Height: 5' 11" (180 3 cm) (02/22/23 1057)  Weight - Scale: 93 9 kg (207 lb) (02/22/23 1057)  SpO2: 97 % (02/22/23 1056)  No intake or output data in the 24 hours ending 02/22/23 1236    Physical Exam:  General:  Resting comfortably in bed   Eyes:Sclera anicteric  ENT: Trachea midline  Pulm:  Symmetric chest raise  No respiratory Distress  CV:  Regular on monitor  Abdomen:  Soft NT ND  Extremities:  No clubbing/ cyanosis/ edema    Lab Results: I have personally reviewed pertinent lab results  Imaging: I have personally reviewed pertinent reports  ASSESSMENT:  Ana Price is a 46 y o  male who presents for outpatient treatment of symptomatic buttock lesion  Plan is for exam under anesthesia with excision of buttock lesion  Risks of anal surgery, including but not limited to pain, bleeding, failure to heal properly, fecal incontinence, persistent or recurrent anal disease, infection, fistula, abscess were reviewed  Questions were answered

## 2023-02-22 NOTE — DISCHARGE INSTR - AVS FIRST PAGE
May shower or tub bathe beginning today  Ensure you are not getting constipated using Metamucil 1 tablespoon 1-2 times daily with plenty of hydration  Pain medication as prescribed for pain may be combined with Tylenol  May use ibuprofen as well     Call for worsening pain, heavy bleeding, inability to urinate or fever greater than 101 5  4 weeks follow-up Dr Wendy Ferrell [phone]

## 2023-02-22 NOTE — ANESTHESIA POSTPROCEDURE EVALUATION
Post-Op Assessment Note    CV Status:  Stable  Pain Score: 0    Pain management: adequate     Mental Status:  Alert   Hydration Status:  Stable   PONV Controlled:  None   Airway Patency:  Patent      Post Op Vitals Reviewed: Yes      Staff: Anesthesiologist         No notable events documented      /73 (02/22/23 1445)    Temp (!) 97 3 °F (36 3 °C) (02/22/23 1445)    Pulse 97 (02/22/23 1445)   Resp 18 (02/22/23 1445)    SpO2 97 % (02/22/23 1445)

## 2023-02-22 NOTE — OP NOTE
OPERATIVE REPORT  PATIENT NAME: Atul Guerrier    :  1970  MRN: [id number]  Pt Location: [facility] OR ROOM 07    SURGERY DATE: 2023    Surgeon(s) and Role:     * Rolando Nieto MD - Primary     * Cayden Biggs MD - Assisting    Preop Diagnosis:  Perianal lesion [K62 9]    Post-Op Diagnosis Codes:     * Perianal lesion [K62 9]    Procedure(s):  EXAM UNDER ANESTHESIA (EUA)  EXCISION  BIOPSY LESION/ MASS  ANAL/RECTAL    Specimen(s):  ID Type Source Tests Collected by Time Destination   1 : left lesion Tissue Buttock TISSUE EXAM Rolando Nieto MD 2023 [id number]        Estimated Blood Loss:   Minimal    Drains:  * No LDAs found *    Anesthesia Type:   General    Operative Indications:  Perianal lesion [K62 9]      Operative Findings:  Grade 2 right anterior lateral internal hemorrhoid  Mobile soft tissue lesion of the left anterior buttock    Complications:   None    Procedure and Technique:  After preoperative identification in the preoperative holding area, the patient was taken the operating room placed in the supine position  Following introduction of general anesthesia the patient was in place in the prone jackknife position with buttocks taped apart  Patient was prepped and draped in usual sterile fashion  Timeout was undertaken and procedure begun  Examination was performed  In the area that had been noted in the office in the left anterior lateral quadrant approxi-6 cm from the anal verge there is a palpable lesion  This appeared to be distinct from the skin appeared to be deep to this in the subcutaneous fat  This was mobile and well-circumscribed  In the right anterior lateral position there is a grade 2 internal hemorrhoid  No obvious tracking of this external lesion towards the anus was noted  As such prepared for local excision  Radial incision was made over top of the lesion  This was  free from the skin    In the subcutaneous fat there is a round firm globular area   This did not appear to be infiltrative  This was excised  Cavity was probed  Hemostasis was noted to be excellent  No obvious remnant lesion was noted  Skin was closed in layers using 3-0 Vicryl and 4-0 Monocryl  Local field block was placed  Sterile dressing was applied  Patient was awakened from anesthesia and transferred to recovery room in stable condition       I was present for the entire procedure    Patient Disposition:  PACU         SIGNATURE: Cande Chávez MD  DATE: February 22, 2023  TIME: 2:00 PM

## 2023-03-01 ENCOUNTER — TELEPHONE (OUTPATIENT)
Dept: OTHER | Facility: OTHER | Age: 53
End: 2023-03-01

## 2023-03-29 ENCOUNTER — OFFICE VISIT (OUTPATIENT)
Age: 53
End: 2023-03-29

## 2023-03-29 VITALS — WEIGHT: 213 LBS | HEIGHT: 71 IN | BODY MASS INDEX: 29.82 KG/M2

## 2023-03-29 DIAGNOSIS — R22.2 NODULE OF BUTTOCK: Primary | ICD-10-CM

## 2023-03-29 NOTE — ASSESSMENT & PLAN NOTE
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

## 2023-03-29 NOTE — PROGRESS NOTES
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  Examination shows well-healing surgical incision without signs of recurrent persistent disease  We also discussed other finding of small internal hemorrhoids  He has no significant ongoing symptoms and no further treatment for this is required at present  I recommend continuing high-fiber diet with fiber supplements  If he has new symptoms in the future he will call for evaluation otherwise I will see him back as needed      HPI   Neela Mccollum is here today for post op evaluation  He is feeling well today  Patient denies any rectal bleeding, change in bowel habits or new lesions at his bottom  He is status post exam under anesthesia on 2/22/2023  Surgical pathology reported:  A  Soft Tissue, Other, lesion left buttock:  - 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  See comment  Past Medical History:   Diagnosis Date   • Hypertension    • Hypothyroidism      Past Surgical History:   Procedure Laterality Date   • HEMORRHOID SURGERY  12/2016    [place]   • [facility] [place] SURG REQ ANES GENERAL SPI/EDRL DX N/A 2/22/2023    Procedure: EXAM UNDER ANESTHESIA (EUA);   Surgeon: Namita Ibrahim MD;  Location: [facility];  Service: Colorectal   • RECTAL BIOPSY N/A 2/22/2023    Procedure: EXCISION  BIOPSY LESION/ MASS  ANAL/RECTAL;  Surgeon: Namita Ibrahim MD;  Location: [facility];  Service: Colorectal       Current Outpatient Medications:   •  METAMUCIL FIBER PO, Take by mouth in the morning, Disp: , Rfl:   •  NP Thyroid 60 MG tablet, TAKE 2 TABLETS DAILY IN THEMORNING, Disp: 180 tablet, Rfl: 3  •  valsartan (DIOVAN) 160 mg tablet, TAKE 1 TABLET BY MOUTH EVERY DAY, Disp: 90 tablet, Rfl: 1  Allergies as of 03/29/2023   • (No Known Allergies)     Review of Systems   All other systems reviewed and are negative  There were no vitals filed for this visit  Physical Exam  Constitutional:       Appearance: Normal appearance  HENT:      Head: Normocephalic and atraumatic  Eyes:      Extraocular Movements: Extraocular movements intact  Pupils: Pupils are equal, round, and reactive to light  Genitourinary:     Penis: Normal        Testes: Normal       Comments: Well-healed buttock incision  Musculoskeletal:         General: Normal range of motion  Skin:     General: Skin is warm and dry  Neurological:      General: No focal deficit present  Mental Status: He is alert and oriented to person, place, and time  Psychiatric:         Mood and Affect: Mood normal          Behavior: Behavior normal          Thought Content:  Thought content normal          Judgment: Judgment normal

## 2023-05-12 ENCOUNTER — OFFICE VISIT (OUTPATIENT)
Dept: FAMILY MEDICINE CLINIC | Facility: CLINIC | Age: 53
End: 2023-05-12

## 2023-05-12 VITALS
DIASTOLIC BLOOD PRESSURE: 84 MMHG | HEART RATE: 89 BPM | RESPIRATION RATE: 16 BRPM | SYSTOLIC BLOOD PRESSURE: 135 MMHG | HEIGHT: 71 IN | OXYGEN SATURATION: 97 % | WEIGHT: 215 LBS | BODY MASS INDEX: 30.1 KG/M2

## 2023-05-12 DIAGNOSIS — Z00.00 HEALTHCARE MAINTENANCE: ICD-10-CM

## 2023-05-12 DIAGNOSIS — R06.2 WHEEZING: ICD-10-CM

## 2023-05-12 DIAGNOSIS — I10 ESSENTIAL HYPERTENSION: Primary | ICD-10-CM

## 2023-05-12 DIAGNOSIS — F41.9 ANXIETY: ICD-10-CM

## 2023-05-12 DIAGNOSIS — E03.9 ACQUIRED HYPOTHYROIDISM: ICD-10-CM

## 2023-05-12 RX ORDER — PREDNISONE 10 MG/1
TABLET ORAL
Qty: 21 TABLET | Refills: 0 | Status: SHIPPED | OUTPATIENT
Start: 2023-05-12

## 2023-05-12 RX ORDER — ALBUTEROL SULFATE 90 UG/1
2 AEROSOL, METERED RESPIRATORY (INHALATION) EVERY 6 HOURS PRN
Qty: 18 G | Refills: 1 | Status: SHIPPED | OUTPATIENT
Start: 2023-05-12

## 2023-05-13 NOTE — PROGRESS NOTES
Assessment/Plan: Blood pressure stable  Labs ordered for 6 months  Prednisone ordered for recent increase of seasonal allergies  Refill albuterol for as needed usage  No problem-specific Assessment & Plan notes found for this encounter  Diagnoses and all orders for this visit:    Essential hypertension  -     Comprehensive metabolic panel; Future    Acquired hypothyroidism  -     T3; Future  -     T3, free; Future  -     TSH, 3rd generation; Future    Wheezing  -     albuterol (Ventolin HFA) 90 mcg/act inhaler; Inhale 2 puffs every 6 (six) hours as needed for wheezing  -     predniSONE 10 mg tablet; 6 tabs Day 1, 5 tabs Day 2, 4 tabs Day 3, 3 tabs Day 4, 2 tabs Day 5, 1 tab Day 6  Anxiety    Healthcare maintenance  -     Comprehensive metabolic panel; Future  -     Lipid panel; Future        1  Essential hypertension  Comprehensive metabolic panel      2  Acquired hypothyroidism  T3    T3, free    TSH, 3rd generation      3  Wheezing  albuterol (Ventolin HFA) 90 mcg/act inhaler    predniSONE 10 mg tablet      4  Anxiety        5  Healthcare maintenance  Comprehensive metabolic panel    Lipid panel          Subjective:        Patient ID: Geovanni Johnson is a 48 y o  male  Chief Complaint   Patient presents with   • Follow-up       Blood pressure recheck  Overall well  Mentions sometimes occasional wheeze  Having some allergy symptoms  The following portions of the patient's history were reviewed and updated as appropriate: past medical history, past surgical history and problem list       Review of Systems   Constitutional: Negative for fatigue  HENT: Positive for rhinorrhea and sneezing  Negative for congestion  Eyes: Negative for visual disturbance  Respiratory: Positive for wheezing  Negative for cough and shortness of breath  Cardiovascular: Negative for chest pain, palpitations and leg swelling  Gastrointestinal: Negative for abdominal pain     Neurological: Negative for "dizziness and headaches  Psychiatric/Behavioral: The patient is not nervous/anxious  Objective:  /84 (BP Location: Left arm, Patient Position: Sitting, Cuff Size: Adult)   Pulse 89   Resp 16   Ht 5' 11\" (1 803 m)   Wt 97 5 kg (215 lb)   SpO2 97%   BMI 29 99 kg/m²        Physical Exam  Vitals and nursing note reviewed  Constitutional:       General: He is not in acute distress  HENT:      Head: Normocephalic  Eyes:      General: No scleral icterus  Pupils: Pupils are equal, round, and reactive to light  Cardiovascular:      Rate and Rhythm: Normal rate and regular rhythm  Heart sounds: Normal heart sounds  No murmur heard  Pulmonary:      Breath sounds: Normal breath sounds  Musculoskeletal:      Right lower leg: No edema  Left lower leg: No edema  Lymphadenopathy:      Cervical: No cervical adenopathy  Skin:     Coloration: Skin is not pale  Neurological:      General: No focal deficit present  Mental Status: He is alert and oriented to person, place, and time  Psychiatric:         Behavior: Behavior normal          Thought Content:  Thought content normal          "

## 2023-05-23 ENCOUNTER — TELEPHONE (OUTPATIENT)
Dept: FAMILY MEDICINE CLINIC | Facility: CLINIC | Age: 53
End: 2023-05-23

## 2023-05-23 DIAGNOSIS — Z12.5 PROSTATE CANCER SCREENING: Primary | ICD-10-CM

## 2023-05-23 NOTE — TELEPHONE ENCOUNTER
Patient called requesting PSA, Total screen noticed that was not ordered in labs and would like to have that done before appointment  Please place order

## 2023-05-24 NOTE — TELEPHONE ENCOUNTER
Left message for patient orders placed by Dr Delisa Owen Slider, DO  (Yesterday,  8:51 AM)  PSA ordered  If he goes to [facility] the orders are in the computer    If he goes to another lab he will need to be mailed to him

## 2023-06-19 ENCOUNTER — OCCMED (OUTPATIENT)
Dept: URGENT CARE | Age: 53
End: 2023-06-19
Payer: OTHER MISCELLANEOUS

## 2023-06-19 ENCOUNTER — APPOINTMENT (OUTPATIENT)
Dept: RADIOLOGY | Age: 53
End: 2023-06-19
Payer: COMMERCIAL

## 2023-06-19 DIAGNOSIS — M79.671 RIGHT FOOT PAIN: ICD-10-CM

## 2023-06-19 DIAGNOSIS — S90.32XA CONTUSION OF LEFT FOOT, INITIAL ENCOUNTER: Primary | ICD-10-CM

## 2023-06-19 PROCEDURE — 73630 X-RAY EXAM OF FOOT: CPT

## 2023-06-19 PROCEDURE — 99283 EMERGENCY DEPT VISIT LOW MDM: CPT

## 2023-06-19 PROCEDURE — G0382 LEV 3 HOSP TYPE B ED VISIT: HCPCS

## 2023-10-20 DIAGNOSIS — I10 ESSENTIAL HYPERTENSION: ICD-10-CM

## 2023-10-20 RX ORDER — VALSARTAN 160 MG/1
TABLET ORAL
Qty: 90 TABLET | Refills: 1 | Status: SHIPPED | OUTPATIENT
Start: 2023-10-20

## 2023-11-05 LAB
ALBUMIN SERPL-MCNC: 4.2 G/DL (ref 3.6–5.1)
ALBUMIN/GLOB SERPL: 1.7 (CALC) (ref 1–2.5)
ALP SERPL-CCNC: 55 U/L (ref 35–144)
ALT SERPL-CCNC: 21 U/L (ref 9–46)
AST SERPL-CCNC: 14 U/L (ref 10–35)
BILIRUB SERPL-MCNC: 0.5 MG/DL (ref 0.2–1.2)
BUN SERPL-MCNC: 13 MG/DL (ref 7–25)
BUN/CREAT SERPL: NORMAL (CALC) (ref 6–22)
CALCIUM SERPL-MCNC: 9.4 MG/DL (ref 8.6–10.3)
CHLORIDE SERPL-SCNC: 105 MMOL/L (ref 98–110)
CHOLEST SERPL-MCNC: 166 MG/DL
CHOLEST/HDLC SERPL: 4.3 (CALC)
CO2 SERPL-SCNC: 28 MMOL/L (ref 20–32)
CREAT SERPL-MCNC: 1.02 MG/DL (ref 0.7–1.3)
GFR/BSA.PRED SERPLBLD CYS-BASED-ARV: 88 ML/MIN/1.73M2
GLOBULIN SER CALC-MCNC: 2.5 G/DL (CALC) (ref 1.9–3.7)
GLUCOSE SERPL-MCNC: 90 MG/DL (ref 65–99)
HDLC SERPL-MCNC: 39 MG/DL
LDLC SERPL CALC-MCNC: 97 MG/DL (CALC)
NONHDLC SERPL-MCNC: 127 MG/DL (CALC)
POTASSIUM SERPL-SCNC: 4.3 MMOL/L (ref 3.5–5.3)
PROT SERPL-MCNC: 6.7 G/DL (ref 6.1–8.1)
PSA SERPL-MCNC: 0.51 NG/ML
SODIUM SERPL-SCNC: 140 MMOL/L (ref 135–146)
T3 SERPL-MCNC: 128 NG/DL (ref 76–181)
T3FREE SERPL-MCNC: 3.9 PG/ML (ref 2.3–4.2)
TRIGL SERPL-MCNC: 205 MG/DL
TSH SERPL-ACNC: 6.8 MIU/L (ref 0.4–4.5)

## 2023-11-20 ENCOUNTER — OFFICE VISIT (OUTPATIENT)
Dept: FAMILY MEDICINE CLINIC | Facility: CLINIC | Age: 53
End: 2023-11-20
Payer: COMMERCIAL

## 2023-11-20 VITALS
SYSTOLIC BLOOD PRESSURE: 142 MMHG | WEIGHT: 219.8 LBS | HEIGHT: 71 IN | BODY MASS INDEX: 30.77 KG/M2 | HEART RATE: 91 BPM | DIASTOLIC BLOOD PRESSURE: 88 MMHG | OXYGEN SATURATION: 98 %

## 2023-11-20 DIAGNOSIS — F32.89 OTHER DEPRESSION: ICD-10-CM

## 2023-11-20 DIAGNOSIS — Z12.11 COLON CANCER SCREENING: Primary | ICD-10-CM

## 2023-11-20 DIAGNOSIS — E03.9 ACQUIRED HYPOTHYROIDISM: ICD-10-CM

## 2023-11-20 DIAGNOSIS — Z12.5 PROSTATE CANCER SCREENING: ICD-10-CM

## 2023-11-20 PROCEDURE — 99396 PREV VISIT EST AGE 40-64: CPT | Performed by: FAMILY MEDICINE

## 2023-11-20 RX ORDER — BUPROPION HYDROCHLORIDE 150 MG/1
150 TABLET ORAL EVERY MORNING
Qty: 30 TABLET | Refills: 5 | Status: SHIPPED | OUTPATIENT
Start: 2023-11-20 | End: 2024-05-18

## 2023-11-28 NOTE — PROGRESS NOTES
Assessment/Plan: Health maintenance completed. Blood pressure typically in the office is elevated but home blood pressures are better. Mood stable. Vaccines discussed. Recent labs showed normal CMP. Lipid shows total cholesterol 166 with an LDL of 97. Triglycerides somewhat elevated at 205. Continue risk factor modification with regards to diet and exercise. TSH 6.8 but T3 Free is 3.9. Patient prefers to go by this and is on NP thyroid versus traditional levothyroxine per his preference. PSA is in good range. Cologuard up-to-date. Recheck every 6 months. Aware that I am retiring. Will follow-up with one of her other providers. Wish him well and good health. Health Maintenance  Lifestyle Advice: attempt to lose weight  Diet: limited junk food  Exercise: intermittently  Dental: regular dental visits  Vision: no vision problems  Preventative Health: Male Preventative: Cholesterol screening up to date    No problem-specific Assessment & Plan notes found for this encounter. Diagnoses and all orders for this visit:    Colon cancer screening  -     Cologuard  -     Comprehensive metabolic panel; Future    Prostate cancer screening  -     PSA, Total Screen; Future    Acquired hypothyroidism  -     Comprehensive metabolic panel; Future  -     Lipid panel; Future  -     TSH, 3rd generation; Future  -     T3, free; Future  -     T3; Future    Other depression  -     buPROPion (WELLBUTRIN XL) 150 mg 24 hr tablet; Take 1 tablet (150 mg total) by mouth every morning                  Subjective:      Patient ID: Emiliano Pack is a 48 y.o. male.     Yearly physical.  Feels well          The following portions of the patient's history were reviewed and updated as appropriate: allergies, current medications, past family history, past medical history, past social history, past surgical history and problem list.    Review of Systems   Constitutional:  Negative for appetite change, fatigue, fever and unexpected weight change. HENT:  Negative for congestion, ear pain, postnasal drip, rhinorrhea, sinus pressure, sinus pain and sore throat. Eyes:  Negative for redness and visual disturbance. Respiratory:  Negative for chest tightness and shortness of breath. Cardiovascular:  Negative for chest pain, palpitations and leg swelling. Gastrointestinal:  Negative for abdominal distention, abdominal pain, diarrhea and nausea. Endocrine: Negative for cold intolerance and heat intolerance. Genitourinary:  Negative for dysuria and hematuria. Musculoskeletal:  Negative for arthralgias, gait problem and myalgias. Skin:  Negative for pallor and rash. Neurological:  Negative for dizziness, tremors, weakness, light-headedness and headaches. Psychiatric/Behavioral:  Negative for behavioral problems. The patient is not nervous/anxious. Objective:    /88 (BP Location: Left arm, Patient Position: Sitting, Cuff Size: Adult)   Pulse 91   Ht 5' 11" (1.803 m)   Wt 99.7 kg (219 lb 12.8 oz)   SpO2 98%   BMI 30.66 kg/m²          Physical Exam  Vitals and nursing note reviewed. Constitutional:       General: He is not in acute distress. Appearance: Normal appearance. He is not ill-appearing. HENT:      Head: Normocephalic. Eyes:      General: No scleral icterus. Pupils: Pupils are equal, round, and reactive to light. Cardiovascular:      Rate and Rhythm: Normal rate and regular rhythm. Heart sounds: Normal heart sounds. No murmur heard. Pulmonary:      Breath sounds: Normal breath sounds. Abdominal:      General: There is no distension. Musculoskeletal:      Right lower leg: No edema. Left lower leg: No edema. Lymphadenopathy:      Cervical: No cervical adenopathy. Skin:     Coloration: Skin is not pale. Neurological:      General: No focal deficit present. Mental Status: He is alert and oriented to person, place, and time. Mental status is at baseline. Psychiatric:         Mood and Affect: Mood normal.         Behavior: Behavior normal.         Thought Content: Thought content normal.         Judgment: Judgment normal.             Patient has no known allergies. Enrique Castro had no medications administered during this visit. Health Maintenance   Topic Date Due    COVID-19 Vaccine (4 - Pfizer series) 01/17/2022    BMI: Followup Plan  04/18/2023    Annual Physical  11/18/2023    Colorectal Cancer Screening  11/10/2023    Depression Screening  05/12/2024    Hepatitis C Screening  07/07/2025 (Originally 1970)    HIV Screening  08/07/2025 (Originally 3/6/1985)    BMI: Adult  11/20/2024    DTaP,Tdap,and Td Vaccines (2 - Td or Tdap) 01/01/2027    Influenza Vaccine  Completed    Pneumococcal Vaccine: Pediatrics (0 to 5 Years) and At-Risk Patients (6 to 59 Years)  Aged Out    HIB Vaccine  Aged Out    IPV Vaccine  Aged Out    Hepatitis A Vaccine  Aged Out    Meningococcal ACWY Vaccine  Aged Out    HPV Vaccine  Aged Out      Social History     Socioeconomic History    Marital status: /Civil Union     Spouse name: Not on file    Number of children: Not on file    Years of education: Not on file    Highest education level: Not on file   Occupational History    Not on file   Tobacco Use    Smoking status: Former    Smokeless tobacco: Former     Types: Chew   Vaping Use    Vaping Use: Never used   Substance and Sexual Activity    Alcohol use:  Yes     Alcohol/week: 2.0 standard drinks of alcohol     Types: 2 Standard drinks or equivalent per week     Comment: Social    Drug use: No    Sexual activity: Yes     Partners: Female   Other Topics Concern    Not on file   Social History Narrative    Not on file     Social Determinants of Health     Financial Resource Strain: Not on file   Food Insecurity: Not on file   Transportation Needs: Not on file   Physical Activity: Not on file   Stress: Not on file   Social Connections: Not on file   Intimate Partner Violence: Not on file   Housing Stability: Not on file      Family History   Problem Relation Age of Onset    No Known Problems Mother     No Known Problems Father     Colon cancer Family       Past Medical History:   Diagnosis Date    Hypertension     Hypothyroidism       has a past surgical history that includes Hemorrhoid surgery (12/2016); pr anrct xm surg req anes general spi/edrl dx (N/A, 2/22/2023); and Rectal biopsy (N/A, 2/22/2023). Patient Active Problem List    Diagnosis Date Noted    Nodule of buttock 12/30/2022    Essential hypertension 09/30/2021    Thyroid nodule 05/05/2017    Anxiety 05/26/2016    GERD without esophagitis 05/25/2016    IBS (irritable bowel syndrome) 04/14/2016    Cervical somatic dysfunction 01/15/2016    Radiculopathy 12/21/2015    Right shoulder pain 12/21/2015    Hyperlipemia 10/07/2015    Internal hemorrhoids 10/07/2015    Vitamin D deficiency 10/20/2013    Hypothyroidism 09/03/2012       Current Outpatient Medications:     albuterol (Ventolin HFA) 90 mcg/act inhaler, Inhale 2 puffs every 6 (six) hours as needed for wheezing, Disp: 18 g, Rfl: 1    buPROPion (WELLBUTRIN XL) 150 mg 24 hr tablet, Take 1 tablet (150 mg total) by mouth every morning, Disp: 30 tablet, Rfl: 5    METAMUCIL FIBER PO, Take by mouth in the morning, Disp: , Rfl:     NP Thyroid 60 MG tablet, TAKE 2 TABLETS DAILY IN THEMORNING, Disp: 180 tablet, Rfl: 3    valsartan (DIOVAN) 160 mg tablet, TAKE 1 TABLET BY MOUTH EVERY DAY, Disp: 90 tablet, Rfl: 1              BMI Counseling: Body mass index is 30.66 kg/m². The BMI is above normal. Nutrition recommendations include decreasing portion sizes, encouraging healthy choices of fruits and vegetables, decreasing fast food intake, consuming healthier snacks, limiting drinks that contain sugar, reducing intake of saturated and trans fat and reducing intake of cholesterol. Exercise recommendations include exercising 3-5 times per week.  Rationale for BMI follow-up plan is due to patient being overweight or obese.

## 2023-12-12 DIAGNOSIS — F32.89 OTHER DEPRESSION: ICD-10-CM

## 2023-12-12 RX ORDER — BUPROPION HYDROCHLORIDE 150 MG/1
150 TABLET ORAL EVERY MORNING
Qty: 90 TABLET | Refills: 2 | Status: SHIPPED | OUTPATIENT
Start: 2023-12-12

## 2023-12-27 ENCOUNTER — TELEPHONE (OUTPATIENT)
Dept: FAMILY MEDICINE CLINIC | Facility: CLINIC | Age: 53
End: 2023-12-27

## 2024-01-02 LAB — COLOGUARD RESULT REPORTABLE: NEGATIVE

## 2024-02-22 DIAGNOSIS — I10 ESSENTIAL HYPERTENSION: ICD-10-CM

## 2024-02-22 RX ORDER — VALSARTAN 160 MG/1
TABLET ORAL
Qty: 90 TABLET | Refills: 1 | Status: SHIPPED | OUTPATIENT
Start: 2024-02-22

## 2024-03-17 DIAGNOSIS — I10 ESSENTIAL HYPERTENSION: ICD-10-CM

## 2024-03-18 RX ORDER — VALSARTAN 160 MG/1
TABLET ORAL
Qty: 90 TABLET | Refills: 1 | Status: SHIPPED | OUTPATIENT
Start: 2024-03-18

## 2024-03-22 DIAGNOSIS — E03.9 ACQUIRED HYPOTHYROIDISM: ICD-10-CM

## 2024-03-22 RX ORDER — LEVOTHYROXINE, LIOTHYRONINE 38; 9 UG/1; UG/1
TABLET ORAL
Qty: 180 TABLET | Refills: 1 | Status: SHIPPED | OUTPATIENT
Start: 2024-03-22

## 2024-09-14 DIAGNOSIS — E03.9 ACQUIRED HYPOTHYROIDISM: ICD-10-CM

## 2024-09-14 DIAGNOSIS — I10 ESSENTIAL HYPERTENSION: ICD-10-CM

## 2024-09-16 RX ORDER — LEVOTHYROXINE, LIOTHYRONINE 38; 9 UG/1; UG/1
TABLET ORAL
Qty: 180 TABLET | Refills: 0 | Status: SHIPPED | OUTPATIENT
Start: 2024-09-16

## 2024-09-16 RX ORDER — VALSARTAN 160 MG/1
TABLET ORAL
Qty: 90 TABLET | Refills: 1 | Status: SHIPPED | OUTPATIENT
Start: 2024-09-16

## 2024-09-27 ENCOUNTER — TELEPHONE (OUTPATIENT)
Age: 54
End: 2024-09-27

## 2024-09-27 NOTE — TELEPHONE ENCOUNTER
I called and left the patient a message making him aware he would need to be evaluated in the office, he has not been seen since last year and his provider has since retired.

## 2024-10-21 ENCOUNTER — OFFICE VISIT (OUTPATIENT)
Dept: FAMILY MEDICINE CLINIC | Facility: CLINIC | Age: 54
End: 2024-10-21
Payer: COMMERCIAL

## 2024-10-21 VITALS
TEMPERATURE: 96.9 F | HEART RATE: 64 BPM | OXYGEN SATURATION: 95 % | BODY MASS INDEX: 30.32 KG/M2 | HEIGHT: 71 IN | WEIGHT: 216.6 LBS | SYSTOLIC BLOOD PRESSURE: 126 MMHG | DIASTOLIC BLOOD PRESSURE: 78 MMHG

## 2024-10-21 DIAGNOSIS — S29.019A THORACIC MYOFASCIAL STRAIN, INITIAL ENCOUNTER: Primary | ICD-10-CM

## 2024-10-21 PROCEDURE — 99214 OFFICE O/P EST MOD 30 MIN: CPT | Performed by: FAMILY MEDICINE

## 2024-10-21 RX ORDER — CYCLOBENZAPRINE HCL 10 MG
10 TABLET ORAL
Qty: 20 TABLET | Refills: 0 | Status: SHIPPED | OUTPATIENT
Start: 2024-10-21

## 2024-10-21 RX ORDER — PREDNISONE 10 MG/1
TABLET ORAL
Qty: 33 TABLET | Refills: 0 | Status: SHIPPED | OUTPATIENT
Start: 2024-10-21

## 2024-10-21 NOTE — PROGRESS NOTES
"Assessment/Plan: Consider physical therapy if no improvement in symptoms.  Chest x-ray or other imaging if needed.  Patient will call with any new persisting or worsening symptoms through the week.  Time spent counseling reviewing treatment plan coordinating care and documentation was 30 minutes.     1. Thoracic myofascial strain, initial encounter  -     cyclobenzaprine (FLEXERIL) 10 mg tablet; Take 1 tablet (10 mg total) by mouth daily at bedtime  -     predniSONE 10 mg tablet; 6 tablets daily, all at one time, with food.  Then on day #4 decrease by 1 pill each day until finished.  -     Ambulatory Referral to Physical Therapy; Future  -     XR chest pa and lateral; Future; Expected date: 10/21/2024        Subjective:      Patient ID: Chito Heath is a 54 y.o. male.    Patient with pain to the right parathoracic area for the last several days.  Symptoms are mild to moderate.  Patient denies any chest pain or shortness of breath.  He notes that muscle relaxant at home was helpful over the weekend.  No radiculopathy symptoms.  No rashes or fevers.    Back Pain             The following portions of the patient's history were reviewed and updated as appropriate: allergies, current medications, past family history, past medical history, past social history, past surgical history, and problem list.    Review of Systems   Constitutional: Negative.    HENT: Negative.     Eyes: Negative.    Respiratory: Negative.     Cardiovascular: Negative.    Gastrointestinal: Negative.    Endocrine: Negative.    Genitourinary: Negative.    Musculoskeletal:  Positive for back pain.   Skin: Negative.    Allergic/Immunologic: Negative.    Neurological: Negative.    Hematological: Negative.    Psychiatric/Behavioral: Negative.           Objective:      /78 (BP Location: Left arm, Patient Position: Sitting, Cuff Size: Standard)   Pulse 64   Temp (!) 96.9 °F (36.1 °C) (Tympanic)   Ht 5' 11\" (1.803 m)   Wt 98.2 kg (216 lb 9.6 " oz)   SpO2 95%   BMI 30.21 kg/m²          Physical Exam  Vitals reviewed.   Constitutional:       Appearance: He is well-developed.   HENT:      Head: Normocephalic and atraumatic.      Right Ear: External ear normal. Tympanic membrane is not erythematous or bulging.      Left Ear: External ear normal. Tympanic membrane is not erythematous or bulging.      Nose: Nose normal.      Mouth/Throat:      Mouth: No oral lesions.      Pharynx: No oropharyngeal exudate.   Eyes:      General: No scleral icterus.        Right eye: No discharge.         Left eye: No discharge.      Conjunctiva/sclera: Conjunctivae normal.   Neck:      Thyroid: No thyromegaly.   Cardiovascular:      Rate and Rhythm: Normal rate and regular rhythm.      Heart sounds: Normal heart sounds. No murmur heard.     No friction rub. No gallop.   Pulmonary:      Effort: Pulmonary effort is normal. No respiratory distress.      Breath sounds: No wheezing or rales.   Chest:      Chest wall: No tenderness.   Abdominal:      General: Bowel sounds are normal. There is no distension.      Palpations: Abdomen is soft. There is no mass.      Tenderness: There is no abdominal tenderness. There is no guarding or rebound.   Musculoskeletal:         General: No tenderness or deformity. Normal range of motion.      Cervical back: Normal range of motion and neck supple.   Lymphadenopathy:      Cervical: No cervical adenopathy.   Skin:     General: Skin is warm and dry.      Coloration: Skin is not pale.      Findings: No erythema or rash.   Neurological:      Mental Status: He is alert and oriented to person, place, and time.      Cranial Nerves: No cranial nerve deficit.      Motor: No abnormal muscle tone.      Coordination: Coordination normal.      Deep Tendon Reflexes: Reflexes are normal and symmetric.   Psychiatric:         Behavior: Behavior normal.

## 2024-11-19 LAB
ALBUMIN SERPL-MCNC: 4.1 G/DL (ref 3.6–5.1)
ALBUMIN/GLOB SERPL: 1.6 (CALC) (ref 1–2.5)
ALP SERPL-CCNC: 59 U/L (ref 35–144)
ALT SERPL-CCNC: 23 U/L (ref 9–46)
AST SERPL-CCNC: 16 U/L (ref 10–35)
BILIRUB SERPL-MCNC: 0.4 MG/DL (ref 0.2–1.2)
BUN SERPL-MCNC: 11 MG/DL (ref 7–25)
BUN/CREAT SERPL: ABNORMAL (CALC) (ref 6–22)
CALCIUM SERPL-MCNC: 9.4 MG/DL (ref 8.6–10.3)
CHLORIDE SERPL-SCNC: 107 MMOL/L (ref 98–110)
CHOLEST SERPL-MCNC: 209 MG/DL
CHOLEST/HDLC SERPL: 4.9 (CALC)
CO2 SERPL-SCNC: 28 MMOL/L (ref 20–32)
CREAT SERPL-MCNC: 0.97 MG/DL (ref 0.7–1.3)
GFR/BSA.PRED SERPLBLD CYS-BASED-ARV: 93 ML/MIN/1.73M2
GLOBULIN SER CALC-MCNC: 2.5 G/DL (CALC) (ref 1.9–3.7)
GLUCOSE SERPL-MCNC: 107 MG/DL (ref 65–99)
HDLC SERPL-MCNC: 43 MG/DL
LDLC SERPL CALC-MCNC: 131 MG/DL (CALC)
NONHDLC SERPL-MCNC: 166 MG/DL (CALC)
POTASSIUM SERPL-SCNC: 4.2 MMOL/L (ref 3.5–5.3)
PROT SERPL-MCNC: 6.6 G/DL (ref 6.1–8.1)
PSA SERPL-MCNC: 0.97 NG/ML
SODIUM SERPL-SCNC: 142 MMOL/L (ref 135–146)
T3 SERPL-MCNC: 135 NG/DL (ref 76–181)
T3FREE SERPL-MCNC: 3.7 PG/ML (ref 2.3–4.2)
TRIGL SERPL-MCNC: 213 MG/DL
TSH SERPL-ACNC: 1.01 MIU/L (ref 0.4–4.5)

## 2024-11-21 ENCOUNTER — RESULTS FOLLOW-UP (OUTPATIENT)
Dept: FAMILY MEDICINE CLINIC | Facility: CLINIC | Age: 54
End: 2024-11-21

## 2024-11-21 NOTE — TELEPHONE ENCOUNTER
Patient returned call from the office - advised per notes in chart -Please let patient know that lab testing generally looks okay.    Patient had no questions.

## 2024-12-10 ENCOUNTER — OFFICE VISIT (OUTPATIENT)
Dept: FAMILY MEDICINE CLINIC | Facility: CLINIC | Age: 54
End: 2024-12-10
Payer: COMMERCIAL

## 2024-12-10 VITALS
HEIGHT: 71 IN | OXYGEN SATURATION: 99 % | WEIGHT: 220.6 LBS | HEART RATE: 99 BPM | TEMPERATURE: 97.3 F | BODY MASS INDEX: 30.88 KG/M2 | SYSTOLIC BLOOD PRESSURE: 134 MMHG | DIASTOLIC BLOOD PRESSURE: 82 MMHG

## 2024-12-10 DIAGNOSIS — Z12.5 SCREENING FOR PROSTATE CANCER: ICD-10-CM

## 2024-12-10 DIAGNOSIS — I10 ESSENTIAL HYPERTENSION: ICD-10-CM

## 2024-12-10 DIAGNOSIS — E03.9 ACQUIRED HYPOTHYROIDISM: ICD-10-CM

## 2024-12-10 DIAGNOSIS — Z00.00 WELL ADULT EXAM: Primary | ICD-10-CM

## 2024-12-10 PROCEDURE — 99396 PREV VISIT EST AGE 40-64: CPT | Performed by: FAMILY MEDICINE

## 2024-12-10 RX ORDER — VALSARTAN 160 MG/1
160 TABLET ORAL DAILY
Qty: 90 TABLET | Refills: 1 | Status: SHIPPED | OUTPATIENT
Start: 2024-12-10

## 2024-12-10 RX ORDER — THYROID 60 MG/1
TABLET ORAL
Qty: 180 TABLET | Refills: 1 | Status: SHIPPED | OUTPATIENT
Start: 2024-12-10

## 2024-12-10 NOTE — PROGRESS NOTES
"Name: Chito Heath      : 1970      MRN: [id number]  Encounter Provider: Konrad Mari DO  Encounter Date: 12/10/2024   Encounter department: [facility]  :  Assessment & Plan  Well adult exam  Anticipatory guidance per kennedi.  Recommend good diet and regular exercise.       Acquired hypothyroidism  Recommend continuing thyroid medication and rechecking TSH again in 6 to 12 months.  Orders:    thyroid (NP Thyroid) 60 MG tablet; TAKE 2 TABLETS DAILY IN THEMORNING    CBC and differential    Comprehensive metabolic panel    Lipid Panel with Direct LDL reflex; Future    TSH, 3rd generation with Free T4 reflex; Future    Essential hypertension  Recommend lab testing as below and valsartan.  Orders:    valsartan (DIOVAN) 160 mg tablet; Take 1 tablet (160 mg total) by mouth daily    CBC and differential    Comprehensive metabolic panel    Lipid Panel with Direct LDL reflex; Future    TSH, 3rd generation with Free T4 reflex; Future    Screening for prostate cancer    Orders:    PSA, Total Screen; Future           History of Present Illness     Patient is here for well check.  He recently had lab testing done that was normal.  He is generally feeling well.  He has gotten away from regular exercise.      Review of Systems   Constitutional: Negative.    HENT: Negative.     Eyes: Negative.    Respiratory: Negative.     Cardiovascular: Negative.    Gastrointestinal: Negative.    Endocrine: Negative.    Genitourinary: Negative.    Musculoskeletal: Negative.    Skin: Negative.    Allergic/Immunologic: Negative.    Neurological: Negative.    Hematological: Negative.    Psychiatric/Behavioral: Negative.         Objective   BP (S) 134/82   Pulse 99   Temp (!) 97.3 °F (36.3 °C) (Tympanic)   Ht 5' 11\" (1.803 m)   Wt 100 kg (220 lb 9.6 oz)   SpO2 99%   BMI 30.77 kg/m²      Physical Exam  Vitals reviewed.   Constitutional:       Appearance: He is well-developed.   HENT:      Head: Normocephalic " and atraumatic.      Right Ear: External ear normal.      Left Ear: External ear normal.      Nose: Nose normal.   Eyes:      General: No scleral icterus.        Right eye: No discharge.         Left eye: No discharge.      Conjunctiva/sclera: Conjunctivae normal.      Pupils: Pupils are equal, round, and reactive to light.   Neck:      Thyroid: No thyromegaly.      Vascular: No JVD.      Trachea: No tracheal deviation.   Cardiovascular:      Rate and Rhythm: Normal rate and regular rhythm.      Heart sounds: Normal heart sounds. No murmur heard.     No friction rub. No gallop.   Pulmonary:      Effort: Pulmonary effort is normal. No respiratory distress.      Breath sounds: Normal breath sounds. No stridor. No wheezing or rales.   Chest:      Chest wall: No tenderness.   Abdominal:      General: There is no distension.      Palpations: There is no mass.      Tenderness: There is no abdominal tenderness. There is no guarding or rebound.      Hernia: No hernia is present.   Musculoskeletal:         General: No tenderness or deformity. Normal range of motion.      Cervical back: Normal range of motion and neck supple.   Lymphadenopathy:      Cervical: No cervical adenopathy.   Skin:     General: Skin is warm and dry.      Capillary Refill: Capillary refill takes less than 2 seconds.      Coloration: Skin is not pale.      Findings: No erythema or rash.   Neurological:      Mental Status: He is alert and oriented to person, place, and time.      Cranial Nerves: No cranial nerve deficit.      Sensory: No sensory deficit.      Motor: No abnormal muscle tone.      Coordination: Coordination normal.      Deep Tendon Reflexes: Reflexes normal.   Psychiatric:         Behavior: Behavior normal.

## 2024-12-10 NOTE — ASSESSMENT & PLAN NOTE
Recommend continuing thyroid medication and rechecking TSH again in 6 to 12 months.  Orders:    thyroid (NP Thyroid) 60 MG tablet; TAKE 2 TABLETS DAILY IN THEMORNING    CBC and differential    Comprehensive metabolic panel    Lipid Panel with Direct LDL reflex; Future    TSH, 3rd generation with Free T4 reflex; Future

## 2024-12-10 NOTE — ASSESSMENT & PLAN NOTE
Recommend lab testing as below and valsartan.  Orders:    valsartan (DIOVAN) 160 mg tablet; Take 1 tablet (160 mg total) by mouth daily    CBC and differential    Comprehensive metabolic panel    Lipid Panel with Direct LDL reflex; Future    TSH, 3rd generation with Free T4 reflex; Future

## 2024-12-30 ENCOUNTER — OFFICE VISIT (OUTPATIENT)
Dept: FAMILY MEDICINE CLINIC | Facility: CLINIC | Age: 54
End: 2024-12-30
Payer: COMMERCIAL

## 2024-12-30 VITALS
OXYGEN SATURATION: 95 % | HEART RATE: 67 BPM | SYSTOLIC BLOOD PRESSURE: 134 MMHG | DIASTOLIC BLOOD PRESSURE: 88 MMHG | HEIGHT: 71 IN | WEIGHT: 217 LBS | BODY MASS INDEX: 30.38 KG/M2 | TEMPERATURE: 97.8 F

## 2024-12-30 DIAGNOSIS — R10.32 LEFT LOWER QUADRANT ABDOMINAL PAIN: Primary | ICD-10-CM

## 2024-12-30 PROCEDURE — 99214 OFFICE O/P EST MOD 30 MIN: CPT | Performed by: FAMILY MEDICINE

## 2024-12-30 NOTE — PROGRESS NOTES
"Name: Chito Heath      : 1970      MRN: [id number]  Encounter Provider: Konrad Mari DO  Encounter Date: 2024   Encounter department: [facility]  :  Assessment & Plan  Left lower quadrant abdominal pain  Side effect profile of medication reviewed.  Consider CT scan and lab testing if symptoms persist or worsen.  Patient will call with any fevers.  Orders:    CBC and differential    Comprehensive metabolic panel    UA (URINE) with reflex to Scope; Future    Urine culture; Future    CT abdomen pelvis w contrast; Future           History of Present Illness     Patient is seen today for left lower quadrant abdominal discomfort that is mild over the last 1 to 2 days.  No fevers.  He did have a bowel movement today that was normal.  He feels a bit gassy but denies any back pain.  No dysuria symptoms.    Abdominal Pain      Review of Systems   Constitutional: Negative.    HENT: Negative.     Eyes: Negative.    Respiratory: Negative.     Cardiovascular: Negative.    Gastrointestinal:  Positive for abdominal pain.   Endocrine: Negative.    Genitourinary: Negative.    Musculoskeletal: Negative.    Skin: Negative.    Allergic/Immunologic: Negative.    Neurological: Negative.    Hematological: Negative.    Psychiatric/Behavioral: Negative.         Objective   /88 (BP Location: Left arm, Patient Position: Sitting, Cuff Size: Standard)   Pulse 67   Temp 97.8 °F (36.6 °C) (Tympanic)   Ht 5' 11\" (1.803 m)   Wt 98.4 kg (217 lb)   SpO2 95%   BMI 30.27 kg/m²      Physical Exam  Vitals reviewed.   Constitutional:       Appearance: He is well-developed.   HENT:      Head: Normocephalic and atraumatic.      Right Ear: External ear normal.      Left Ear: External ear normal.      Nose: Nose normal.   Eyes:      General: No scleral icterus.        Right eye: No discharge.         Left eye: No discharge.      Conjunctiva/sclera: Conjunctivae normal.      Pupils: Pupils are equal, " round, and reactive to light.   Neck:      Thyroid: No thyromegaly.      Vascular: No JVD.      Trachea: No tracheal deviation.   Cardiovascular:      Rate and Rhythm: Normal rate and regular rhythm.      Heart sounds: Normal heart sounds. No murmur heard.     No friction rub. No gallop.   Pulmonary:      Effort: Pulmonary effort is normal. No respiratory distress.      Breath sounds: Normal breath sounds. No stridor. No wheezing or rales.   Chest:      Chest wall: No tenderness.   Abdominal:      General: There is no distension.      Palpations: There is no mass.      Tenderness: There is no abdominal tenderness. There is no guarding or rebound.      Hernia: No hernia is present.   Musculoskeletal:         General: No tenderness or deformity. Normal range of motion.      Cervical back: Normal range of motion and neck supple.   Lymphadenopathy:      Cervical: No cervical adenopathy.   Skin:     General: Skin is warm and dry.      Capillary Refill: Capillary refill takes less than 2 seconds.      Coloration: Skin is not pale.      Findings: No erythema or rash.   Neurological:      Mental Status: He is alert and oriented to person, place, and time.      Cranial Nerves: No cranial nerve deficit.      Sensory: No sensory deficit.      Motor: No abnormal muscle tone.      Coordination: Coordination normal.      Deep Tendon Reflexes: Reflexes normal.   Psychiatric:         Behavior: Behavior normal.

## 2025-01-03 ENCOUNTER — TELEPHONE (OUTPATIENT)
Age: 55
End: 2025-01-03

## 2025-01-03 NOTE — TELEPHONE ENCOUNTER
[employer]  Member ID # [id number]  Group # [id number]  [place] [facility] [phone]  Effective  date 1/1/2025  Patient will bring in New insurance card to next visit.

## 2025-03-11 DIAGNOSIS — E03.9 ACQUIRED HYPOTHYROIDISM: ICD-10-CM

## 2025-03-11 DIAGNOSIS — I10 ESSENTIAL HYPERTENSION: ICD-10-CM

## 2025-03-11 NOTE — TELEPHONE ENCOUNTER
Reason for call:   [x] Refill-pt new to pharmacy, scripts need transferred  [] Prior Auth  [] Other:     Office:   [x] PCP/Provider - [facility]  Authorized By: Konrad Mari DO  [] Specialty/Provider -     thyroid (NP Thyroid) 60 MG tablet TAKE 2 TABLETS DAILY IN THE MORNING     valsartan (DIOVAN) 160 mg tablet Take 1 tablet (160 mg total) by mouth daily     Pharmacy: [place] [facility] [id number] [place], [place] - [address] [facility]     Local Pharmacy   Does the patient have enough for 3 days?   [x] Yes   [] No - Send as HP to POD    Mail Away Pharmacy   Does the patient have enough for 10 days?   [] Yes   [] No - Send as HP to POD

## 2025-03-12 RX ORDER — THYROID 60 MG/1
TABLET ORAL
Qty: 180 TABLET | Refills: 1 | Status: SHIPPED | OUTPATIENT
Start: 2025-03-12

## 2025-03-12 RX ORDER — VALSARTAN 160 MG/1
160 TABLET ORAL DAILY
Qty: 90 TABLET | Refills: 1 | Status: SHIPPED | OUTPATIENT
Start: 2025-03-12

## 2025-05-07 ENCOUNTER — OFFICE VISIT (OUTPATIENT)
Dept: FAMILY MEDICINE CLINIC | Facility: CLINIC | Age: 55
End: 2025-05-07
Payer: COMMERCIAL

## 2025-05-07 VITALS
TEMPERATURE: 96.7 F | HEART RATE: 89 BPM | BODY MASS INDEX: 29.76 KG/M2 | OXYGEN SATURATION: 99 % | DIASTOLIC BLOOD PRESSURE: 80 MMHG | WEIGHT: 212.6 LBS | SYSTOLIC BLOOD PRESSURE: 134 MMHG | HEIGHT: 71 IN

## 2025-05-07 DIAGNOSIS — S29.019A THORACIC MYOFASCIAL STRAIN, INITIAL ENCOUNTER: ICD-10-CM

## 2025-05-07 DIAGNOSIS — E03.9 ACQUIRED HYPOTHYROIDISM: ICD-10-CM

## 2025-05-07 DIAGNOSIS — I10 ESSENTIAL HYPERTENSION: Primary | ICD-10-CM

## 2025-05-07 DIAGNOSIS — Z12.5 SCREENING FOR PROSTATE CANCER: ICD-10-CM

## 2025-05-07 PROCEDURE — 99214 OFFICE O/P EST MOD 30 MIN: CPT | Performed by: FAMILY MEDICINE

## 2025-05-07 RX ORDER — PREDNISONE 10 MG/1
TABLET ORAL
Qty: 33 TABLET | Refills: 0 | Status: SHIPPED | OUTPATIENT
Start: 2025-05-07

## 2025-05-07 RX ORDER — THYROID 60 MG/1
TABLET ORAL
Qty: 180 TABLET | Refills: 1 | Status: SHIPPED | OUTPATIENT
Start: 2025-05-07

## 2025-05-07 RX ORDER — THYROID 60 MG/1
TABLET ORAL
Qty: 180 TABLET | Refills: 1 | Status: SHIPPED | OUTPATIENT
Start: 2025-05-07 | End: 2025-05-07 | Stop reason: SDUPTHER

## 2025-05-07 NOTE — ASSESSMENT & PLAN NOTE
Blood pressure is under good control.  Continue current medication.  He is on valsartan 160 mg daily.  Orders:    CBC and differential    Comprehensive metabolic panel    TSH, 3rd generation with Free T4 reflex; Future    UA (URINE) with reflex to Scope; Future

## 2025-05-07 NOTE — PROGRESS NOTES
Name: Chito Heath      : 1970      MRN: [id number]  Encounter Provider: Konrad Mari DO  Encounter Date: 2025   Encounter department: [facility]  :  Assessment & Plan  Essential hypertension  Blood pressure is under good control.  Continue current medication.  He is on valsartan 160 mg daily.  Orders:    CBC and differential    Comprehensive metabolic panel    TSH, 3rd generation with Free T4 reflex; Future    UA (URINE) with reflex to Scope; Future    Acquired hypothyroidism  No significant findings on physical exam.  We did discuss the possibility that the brand of his thyroid medication changing may be affecting how he is feeling.  Recommend recheck thyroid levels.  Await results  Orders:    CBC and differential    Comprehensive metabolic panel    TSH, 3rd generation with Free T4 reflex; Future    UA (URINE) with reflex to Scope; Future    thyroid (NP Thyroid) 60 MG tablet; TAKE 2 TABLETS DAILY IN THEMORNING    Screening for prostate cancer    Orders:    PSA, Total Screen; Future    Thoracic myofascial strain, initial encounter    Orders:    predniSONE 10 mg tablet; 6 tablets daily, all at one time, with food.  Then on day #4 decrease by 1 pill each day until finished.           History of Present Illness   Patient with history of hypertension and hypothyroidism has a few day history of fatigue.  Onset this past week.  He notes occasional hot flash symptoms during the day.  No fevers.  He notes that the generic brand of his thyroid medication recently changed and that he believes that his symptoms may coincide with the timing of the switch in medication.  Dose has been the same.  Blood pressure is stable.  He is compliant with medication.      Review of Systems   Constitutional:  Positive for fatigue. Negative for fever.   HENT: Negative.     Eyes: Negative.    Respiratory: Negative.     Cardiovascular: Negative.    Gastrointestinal: Negative.    Endocrine: Negative.   "  Genitourinary: Negative.    Musculoskeletal: Negative.    Skin: Negative.    Allergic/Immunologic: Negative.    Neurological: Negative.    Hematological: Negative.    Psychiatric/Behavioral: Negative.         Objective   /80 (BP Location: Left arm, Patient Position: Sitting, Cuff Size: Standard)   Pulse 89   Temp (!) 96.7 °F (35.9 °C) (Tympanic)   Ht 5' 11\" (1.803 m)   Wt 96.4 kg (212 lb 9.6 oz)   SpO2 99%   BMI 29.65 kg/m²      Physical Exam  Vitals reviewed.   Constitutional:       Appearance: He is well-developed.   HENT:      Head: Normocephalic and atraumatic.      Right Ear: External ear normal.      Left Ear: External ear normal.      Nose: Nose normal.   Eyes:      General: No scleral icterus.        Right eye: No discharge.         Left eye: No discharge.      Conjunctiva/sclera: Conjunctivae normal.      Pupils: Pupils are equal, round, and reactive to light.   Neck:      Thyroid: No thyromegaly.      Vascular: No JVD.      Trachea: No tracheal deviation.   Cardiovascular:      Rate and Rhythm: Normal rate and regular rhythm.      Heart sounds: Normal heart sounds. No murmur heard.     No friction rub. No gallop.   Pulmonary:      Effort: Pulmonary effort is normal. No respiratory distress.      Breath sounds: Normal breath sounds. No stridor. No wheezing or rales.   Chest:      Chest wall: No tenderness.   Abdominal:      General: There is no distension.      Palpations: There is no mass.      Tenderness: There is no abdominal tenderness. There is no guarding or rebound.      Hernia: No hernia is present.   Musculoskeletal:         General: No tenderness or deformity. Normal range of motion.      Cervical back: Normal range of motion and neck supple.   Lymphadenopathy:      Cervical: No cervical adenopathy.   Skin:     General: Skin is warm and dry.      Capillary Refill: Capillary refill takes less than 2 seconds.      Coloration: Skin is not pale.      Findings: No erythema or rash. "   Neurological:      Mental Status: He is alert and oriented to person, place, and time.      Cranial Nerves: No cranial nerve deficit.      Sensory: No sensory deficit.      Motor: No abnormal muscle tone.      Coordination: Coordination normal.      Deep Tendon Reflexes: Reflexes normal.   Psychiatric:         Behavior: Behavior normal.

## 2025-05-07 NOTE — ASSESSMENT & PLAN NOTE
No significant findings on physical exam.  We did discuss the possibility that the brand of his thyroid medication changing may be affecting how he is feeling.  Recommend recheck thyroid levels.  Await results  Orders:    CBC and differential    Comprehensive metabolic panel    TSH, 3rd generation with Free T4 reflex; Future    UA (URINE) with reflex to Scope; Future    thyroid (NP Thyroid) 60 MG tablet; TAKE 2 TABLETS DAILY IN THEMORNING

## 2025-05-08 LAB
ALBUMIN SERPL-MCNC: 4.4 G/DL (ref 3.6–5.1)
ALBUMIN/GLOB SERPL: 2 (CALC) (ref 1–2.5)
ALP SERPL-CCNC: 62 U/L (ref 35–144)
ALT SERPL-CCNC: 30 U/L (ref 9–46)
APPEARANCE UR: CLEAR
AST SERPL-CCNC: 16 U/L (ref 10–35)
BASOPHILS # BLD AUTO: 67 CELLS/UL (ref 0–200)
BASOPHILS NFR BLD AUTO: 1 %
BILIRUB SERPL-MCNC: 0.5 MG/DL (ref 0.2–1.2)
BILIRUB UR QL STRIP: NEGATIVE
BUN SERPL-MCNC: 12 MG/DL (ref 7–25)
BUN/CREAT SERPL: ABNORMAL (CALC) (ref 6–22)
CALCIUM SERPL-MCNC: 9.5 MG/DL (ref 8.6–10.3)
CHLORIDE SERPL-SCNC: 105 MMOL/L (ref 98–110)
CO2 SERPL-SCNC: 29 MMOL/L (ref 20–32)
COLOR UR: YELLOW
CREAT SERPL-MCNC: 1.06 MG/DL (ref 0.7–1.3)
EOSINOPHIL # BLD AUTO: 348 CELLS/UL (ref 15–500)
EOSINOPHIL NFR BLD AUTO: 5.2 %
ERYTHROCYTE [DISTWIDTH] IN BLOOD BY AUTOMATED COUNT: 13.6 % (ref 11–15)
GFR/BSA.PRED SERPLBLD CYS-BASED-ARV: 83 ML/MIN/1.73M2
GLOBULIN SER CALC-MCNC: 2.2 G/DL (CALC) (ref 1.9–3.7)
GLUCOSE SERPL-MCNC: 105 MG/DL (ref 65–99)
GLUCOSE UR QL STRIP: NEGATIVE
HCT VFR BLD AUTO: 49.3 % (ref 38.5–50)
HGB BLD-MCNC: 16.1 G/DL (ref 13.2–17.1)
HGB UR QL STRIP: NEGATIVE
KETONES UR QL STRIP: NEGATIVE
LEUKOCYTE ESTERASE UR QL STRIP: NEGATIVE
LYMPHOCYTES # BLD AUTO: 1762 CELLS/UL (ref 850–3900)
LYMPHOCYTES NFR BLD AUTO: 26.3 %
MCH RBC QN AUTO: 27.9 PG (ref 27–33)
MCHC RBC AUTO-ENTMCNC: 32.7 G/DL (ref 32–36)
MCV RBC AUTO: 85.4 FL (ref 80–100)
MONOCYTES # BLD AUTO: 643 CELLS/UL (ref 200–950)
MONOCYTES NFR BLD AUTO: 9.6 %
NEUTROPHILS # BLD AUTO: 3879 CELLS/UL (ref 1500–7800)
NEUTROPHILS NFR BLD AUTO: 57.9 %
NITRITE UR QL STRIP: NEGATIVE
PH UR STRIP: 6 [PH] (ref 5–8)
PLATELET # BLD AUTO: 258 THOUSAND/UL (ref 140–400)
PMV BLD REES-ECKER: 10.3 FL (ref 7.5–12.5)
POTASSIUM SERPL-SCNC: 4.1 MMOL/L (ref 3.5–5.3)
PROT SERPL-MCNC: 6.6 G/DL (ref 6.1–8.1)
PROT UR QL STRIP: NEGATIVE
PSA SERPL-MCNC: 1.1 NG/ML
RBC # BLD AUTO: 5.77 MILLION/UL (ref 4.2–5.8)
SODIUM SERPL-SCNC: 140 MMOL/L (ref 135–146)
SP GR UR STRIP: 1.01 (ref 1–1.03)
T4 FREE SERPL-MCNC: 1.1 NG/DL (ref 0.8–1.8)
TSH SERPL-ACNC: 0.03 MIU/L (ref 0.4–4.5)
WBC # BLD AUTO: 6.7 THOUSAND/UL (ref 3.8–10.8)

## 2025-05-09 ENCOUNTER — TELEPHONE (OUTPATIENT)
Age: 55
End: 2025-05-09

## 2025-05-09 ENCOUNTER — RESULTS FOLLOW-UP (OUTPATIENT)
Dept: FAMILY MEDICINE CLINIC | Facility: CLINIC | Age: 55
End: 2025-05-09

## 2025-05-09 NOTE — TELEPHONE ENCOUNTER
Patient called regarding results of recent lab work. He noticed that TSH levels were low. I advised results have not been reviewed as of yet by Dr aMri. He will receive a call back as soon as results have been reviewed. Patient will wait for call back.

## 2025-07-16 NOTE — TELEPHONE ENCOUNTER
Management per primary team   Tell the patient I called in prescription cough gel caps that are 3 times a day as needed for cough

## 2025-07-23 ENCOUNTER — TELEPHONE (OUTPATIENT)
Age: 55
End: 2025-07-23

## 2025-07-23 NOTE — TELEPHONE ENCOUNTER
Patient called and believes he is having problems with his thyroid once again and asked if Dr. Mari can put in lab orders for him to get done and if they come back off he will look into seeing an Endocrinologist. Please advise [phone] thank you.

## 2025-07-24 DIAGNOSIS — E03.9 ACQUIRED HYPOTHYROIDISM: Primary | ICD-10-CM

## 2025-08-03 LAB — TSH SERPL-ACNC: 1.93 MIU/L (ref 0.4–4.5)

## 2025-08-05 ENCOUNTER — DOCUMENTATION (OUTPATIENT)
Age: 55
End: 2025-08-05

## 2025-08-05 DIAGNOSIS — S29.019A THORACIC MYOFASCIAL STRAIN, INITIAL ENCOUNTER: ICD-10-CM

## 2025-08-06 ENCOUNTER — OFFICE VISIT (OUTPATIENT)
Dept: FAMILY MEDICINE CLINIC | Facility: CLINIC | Age: 55
End: 2025-08-06
Payer: COMMERCIAL

## 2025-08-06 VITALS
SYSTOLIC BLOOD PRESSURE: 138 MMHG | BODY MASS INDEX: 29.88 KG/M2 | OXYGEN SATURATION: 96 % | DIASTOLIC BLOOD PRESSURE: 80 MMHG | TEMPERATURE: 96.6 F | HEART RATE: 100 BPM | HEIGHT: 71 IN | WEIGHT: 213.4 LBS

## 2025-08-06 DIAGNOSIS — G47.10 DAYTIME HYPERSOMNIA: Primary | ICD-10-CM

## 2025-08-06 DIAGNOSIS — M54.2 CERVICAL PAIN: ICD-10-CM

## 2025-08-06 PROCEDURE — 99214 OFFICE O/P EST MOD 30 MIN: CPT | Performed by: FAMILY MEDICINE

## 2025-08-06 RX ORDER — MELOXICAM 15 MG/1
15 TABLET ORAL DAILY
Qty: 30 TABLET | Refills: 0 | Status: SHIPPED | OUTPATIENT
Start: 2025-08-06

## 2025-08-06 RX ORDER — CYCLOBENZAPRINE HCL 10 MG
10 TABLET ORAL
Qty: 20 TABLET | Refills: 0 | Status: SHIPPED | OUTPATIENT
Start: 2025-08-06

## 2025-08-07 RX ORDER — PREDNISONE 10 MG/1
TABLET ORAL
Qty: 33 TABLET | Refills: 0 | OUTPATIENT
Start: 2025-08-07

## 2025-08-12 ENCOUNTER — TRANSCRIBE ORDERS (OUTPATIENT)
Dept: SLEEP CENTER | Facility: CLINIC | Age: 55
End: 2025-08-12

## 2025-08-14 ENCOUNTER — TELEPHONE (OUTPATIENT)
Age: 55
End: 2025-08-14

## (undated) DEVICE — ANTIBACTERIAL UNDYED BRAIDED (POLYGLACTIN 910), SYNTHETIC ABSORBABLE SUTURE: Brand: COATED VICRYL

## (undated) DEVICE — LUBRICANT SURGILUBE TUBE 4 OZ  FLIP TOP

## (undated) DEVICE — PAD GROUNDING ADULT

## (undated) DEVICE — GAUZE SPONGES,16 PLY: Brand: CURITY

## (undated) DEVICE — BETHLEHEM UNIVERSAL MINOR GEN: Brand: CARDINAL HEALTH

## (undated) DEVICE — DISPOSABLE BRIEF/UNDERWEAR

## (undated) DEVICE — 3000CC GUARDIAN II: Brand: GUARDIAN

## (undated) DEVICE — GLOVE INDICATOR PI UNDERGLOVE SZ 7.5 BLUE

## (undated) DEVICE — TUBING SUCTION 5MM X 12 FT

## (undated) DEVICE — NEEDLE HYPO 22G X 1-1/2 IN

## (undated) DEVICE — POOLE SUCTION HANDLE: Brand: CARDINAL HEALTH

## (undated) DEVICE — GOWN ,SIRUS ,NONREINFORCED 4XL: Brand: MEDLINE

## (undated) DEVICE — SUT VICRYL 3-0 SH 27 IN J416H

## (undated) DEVICE — 3M™ DURAPORE™ SURGICAL TAPE 1538-3, 3 INCH X 10 YARD (7,5CM X 9,1M), 4 ROLLS/BOX: Brand: 3M™ DURAPORE™

## (undated) DEVICE — INTENDED FOR TISSUE SEPARATION, AND OTHER PROCEDURES THAT REQUIRE A SHARP SURGICAL BLADE TO PUNCTURE OR CUT.: Brand: BARD-PARKER SAFETY BLADES SIZE 15, STERILE

## (undated) DEVICE — ELECTRODE BLADE MOD  E-Z CLEAN 6.5IN -0014M

## (undated) DEVICE — SPONGE STICK WITH PVP-I: Brand: KENDALL

## (undated) DEVICE — POV-IOD SOLUTION 4OZ BT

## (undated) DEVICE — GLOVE SRG BIOGEL 7.5

## (undated) DEVICE — SUT VICRYL PLUS 0 UR-6 27IN VCP603H

## (undated) DEVICE — GELFOAM 100

## (undated) DEVICE — PLUMEPEN PRO 10FT

## (undated) DEVICE — VEST COOLING PLUS SZ SNGL USE

## (undated) DEVICE — ALL PURPOSE SPONGES,NONWOVEN, 4 PLY: Brand: CURITY

## (undated) DEVICE — BASIC SINGLE BASIN 2-LF: Brand: MEDLINE INDUSTRIES, INC.

## (undated) DEVICE — PREMIUM DRY TRAY LF: Brand: MEDLINE INDUSTRIES, INC.